# Patient Record
Sex: FEMALE | Race: OTHER | NOT HISPANIC OR LATINO | URBAN - METROPOLITAN AREA
[De-identification: names, ages, dates, MRNs, and addresses within clinical notes are randomized per-mention and may not be internally consistent; named-entity substitution may affect disease eponyms.]

---

## 2023-08-29 ENCOUNTER — EMERGENCY (EMERGENCY)
Facility: HOSPITAL | Age: 78
LOS: 0 days | Discharge: HOME | End: 2023-08-29
Attending: EMERGENCY MEDICINE
Payer: MEDICARE

## 2023-08-29 VITALS
DIASTOLIC BLOOD PRESSURE: 78 MMHG | SYSTOLIC BLOOD PRESSURE: 127 MMHG | OXYGEN SATURATION: 97 % | TEMPERATURE: 98 F | RESPIRATION RATE: 20 BRPM | HEART RATE: 75 BPM

## 2023-08-29 VITALS
TEMPERATURE: 98 F | SYSTOLIC BLOOD PRESSURE: 187 MMHG | RESPIRATION RATE: 18 BRPM | DIASTOLIC BLOOD PRESSURE: 106 MMHG | HEART RATE: 71 BPM | OXYGEN SATURATION: 96 % | WEIGHT: 78.04 LBS

## 2023-08-29 DIAGNOSIS — I10 ESSENTIAL (PRIMARY) HYPERTENSION: ICD-10-CM

## 2023-08-29 DIAGNOSIS — N39.0 URINARY TRACT INFECTION, SITE NOT SPECIFIED: ICD-10-CM

## 2023-08-29 DIAGNOSIS — R13.10 DYSPHAGIA, UNSPECIFIED: ICD-10-CM

## 2023-08-29 DIAGNOSIS — F02.80 DEMENTIA IN OTHER DISEASES CLASSIFIED ELSEWHERE, UNSPECIFIED SEVERITY, WITHOUT BEHAVIORAL DISTURBANCE, PSYCHOTIC DISTURBANCE, MOOD DISTURBANCE, AND ANXIETY: ICD-10-CM

## 2023-08-29 DIAGNOSIS — Z86.73 PERSONAL HISTORY OF TRANSIENT ISCHEMIC ATTACK (TIA), AND CEREBRAL INFARCTION WITHOUT RESIDUAL DEFICITS: ICD-10-CM

## 2023-08-29 DIAGNOSIS — G30.9 ALZHEIMER'S DISEASE, UNSPECIFIED: ICD-10-CM

## 2023-08-29 DIAGNOSIS — R41.0 DISORIENTATION, UNSPECIFIED: ICD-10-CM

## 2023-08-29 LAB
ALBUMIN SERPL ELPH-MCNC: 4.1 G/DL — SIGNIFICANT CHANGE UP (ref 3.5–5.2)
ALP SERPL-CCNC: 100 U/L — SIGNIFICANT CHANGE UP (ref 30–115)
ALT FLD-CCNC: 12 U/L — SIGNIFICANT CHANGE UP (ref 0–41)
ANION GAP SERPL CALC-SCNC: 10 MMOL/L — SIGNIFICANT CHANGE UP (ref 7–14)
APPEARANCE UR: ABNORMAL
APTT BLD: 34.2 SEC — SIGNIFICANT CHANGE UP (ref 27–39.2)
AST SERPL-CCNC: 16 U/L — SIGNIFICANT CHANGE UP (ref 0–41)
BASOPHILS # BLD AUTO: 0.05 K/UL — SIGNIFICANT CHANGE UP (ref 0–0.2)
BASOPHILS NFR BLD AUTO: 0.5 % — SIGNIFICANT CHANGE UP (ref 0–1)
BILIRUB SERPL-MCNC: 0.6 MG/DL — SIGNIFICANT CHANGE UP (ref 0.2–1.2)
BILIRUB UR-MCNC: NEGATIVE — SIGNIFICANT CHANGE UP
BUN SERPL-MCNC: 14 MG/DL — SIGNIFICANT CHANGE UP (ref 10–20)
CALCIUM SERPL-MCNC: 9.5 MG/DL — SIGNIFICANT CHANGE UP (ref 8.4–10.5)
CHLORIDE SERPL-SCNC: 102 MMOL/L — SIGNIFICANT CHANGE UP (ref 98–110)
CO2 SERPL-SCNC: 24 MMOL/L — SIGNIFICANT CHANGE UP (ref 17–32)
COLOR SPEC: YELLOW — SIGNIFICANT CHANGE UP
CREAT SERPL-MCNC: 0.7 MG/DL — SIGNIFICANT CHANGE UP (ref 0.7–1.5)
DIFF PNL FLD: ABNORMAL
EGFR: 89 ML/MIN/1.73M2 — SIGNIFICANT CHANGE UP
EOSINOPHIL # BLD AUTO: 0.09 K/UL — SIGNIFICANT CHANGE UP (ref 0–0.7)
EOSINOPHIL NFR BLD AUTO: 0.9 % — SIGNIFICANT CHANGE UP (ref 0–8)
GLUCOSE SERPL-MCNC: 89 MG/DL — SIGNIFICANT CHANGE UP (ref 70–99)
GLUCOSE UR QL: NEGATIVE MG/DL — SIGNIFICANT CHANGE UP
HCT VFR BLD CALC: 37.6 % — SIGNIFICANT CHANGE UP (ref 37–47)
HGB BLD-MCNC: 12.2 G/DL — SIGNIFICANT CHANGE UP (ref 12–16)
IMM GRANULOCYTES NFR BLD AUTO: 0.2 % — SIGNIFICANT CHANGE UP (ref 0.1–0.3)
INR BLD: 0.96 RATIO — SIGNIFICANT CHANGE UP (ref 0.65–1.3)
KETONES UR-MCNC: NEGATIVE MG/DL — SIGNIFICANT CHANGE UP
LEUKOCYTE ESTERASE UR-ACNC: ABNORMAL
LYMPHOCYTES # BLD AUTO: 2.7 K/UL — SIGNIFICANT CHANGE UP (ref 1.2–3.4)
LYMPHOCYTES # BLD AUTO: 27 % — SIGNIFICANT CHANGE UP (ref 20.5–51.1)
MCHC RBC-ENTMCNC: 29.8 PG — SIGNIFICANT CHANGE UP (ref 27–31)
MCHC RBC-ENTMCNC: 32.4 G/DL — SIGNIFICANT CHANGE UP (ref 32–37)
MCV RBC AUTO: 91.7 FL — SIGNIFICANT CHANGE UP (ref 81–99)
MONOCYTES # BLD AUTO: 0.62 K/UL — HIGH (ref 0.1–0.6)
MONOCYTES NFR BLD AUTO: 6.2 % — SIGNIFICANT CHANGE UP (ref 1.7–9.3)
NEUTROPHILS # BLD AUTO: 6.53 K/UL — HIGH (ref 1.4–6.5)
NEUTROPHILS NFR BLD AUTO: 65.2 % — SIGNIFICANT CHANGE UP (ref 42.2–75.2)
NITRITE UR-MCNC: POSITIVE
NRBC # BLD: 0 /100 WBCS — SIGNIFICANT CHANGE UP (ref 0–0)
PH UR: 6.5 — SIGNIFICANT CHANGE UP (ref 5–8)
PLATELET # BLD AUTO: 389 K/UL — SIGNIFICANT CHANGE UP (ref 130–400)
PMV BLD: 9.7 FL — SIGNIFICANT CHANGE UP (ref 7.4–10.4)
POTASSIUM SERPL-MCNC: 4.2 MMOL/L — SIGNIFICANT CHANGE UP (ref 3.5–5)
POTASSIUM SERPL-SCNC: 4.2 MMOL/L — SIGNIFICANT CHANGE UP (ref 3.5–5)
PROT SERPL-MCNC: 6.4 G/DL — SIGNIFICANT CHANGE UP (ref 6–8)
PROT UR-MCNC: SIGNIFICANT CHANGE UP MG/DL
PROTHROM AB SERPL-ACNC: 10.9 SEC — SIGNIFICANT CHANGE UP (ref 9.95–12.87)
RBC # BLD: 4.1 M/UL — LOW (ref 4.2–5.4)
RBC # FLD: 12.1 % — SIGNIFICANT CHANGE UP (ref 11.5–14.5)
SODIUM SERPL-SCNC: 136 MMOL/L — SIGNIFICANT CHANGE UP (ref 135–146)
SP GR SPEC: >1.03 — HIGH (ref 1–1.03)
TROPONIN T SERPL-MCNC: <0.01 NG/ML — SIGNIFICANT CHANGE UP
UROBILINOGEN FLD QL: 0.2 MG/DL — SIGNIFICANT CHANGE UP (ref 0.2–1)
WBC # BLD: 10.01 K/UL — SIGNIFICANT CHANGE UP (ref 4.8–10.8)
WBC # FLD AUTO: 10.01 K/UL — SIGNIFICANT CHANGE UP (ref 4.8–10.8)

## 2023-08-29 PROCEDURE — 0042T: CPT | Mod: MA

## 2023-08-29 PROCEDURE — 81001 URINALYSIS AUTO W/SCOPE: CPT

## 2023-08-29 PROCEDURE — 85025 COMPLETE CBC W/AUTO DIFF WBC: CPT

## 2023-08-29 PROCEDURE — 70498 CT ANGIOGRAPHY NECK: CPT | Mod: MA

## 2023-08-29 PROCEDURE — 70496 CT ANGIOGRAPHY HEAD: CPT | Mod: 26,MA

## 2023-08-29 PROCEDURE — 99285 EMERGENCY DEPT VISIT HI MDM: CPT | Mod: 25

## 2023-08-29 PROCEDURE — 85730 THROMBOPLASTIN TIME PARTIAL: CPT

## 2023-08-29 PROCEDURE — 80053 COMPREHEN METABOLIC PANEL: CPT

## 2023-08-29 PROCEDURE — 82962 GLUCOSE BLOOD TEST: CPT

## 2023-08-29 PROCEDURE — 93005 ELECTROCARDIOGRAM TRACING: CPT

## 2023-08-29 PROCEDURE — 70450 CT HEAD/BRAIN W/O DYE: CPT | Mod: XU,MA

## 2023-08-29 PROCEDURE — 87186 SC STD MICRODIL/AGAR DIL: CPT

## 2023-08-29 PROCEDURE — 70496 CT ANGIOGRAPHY HEAD: CPT | Mod: MA

## 2023-08-29 PROCEDURE — 85610 PROTHROMBIN TIME: CPT

## 2023-08-29 PROCEDURE — 84484 ASSAY OF TROPONIN QUANT: CPT

## 2023-08-29 PROCEDURE — 93010 ELECTROCARDIOGRAM REPORT: CPT

## 2023-08-29 PROCEDURE — 71045 X-RAY EXAM CHEST 1 VIEW: CPT

## 2023-08-29 PROCEDURE — 96374 THER/PROPH/DIAG INJ IV PUSH: CPT | Mod: XU

## 2023-08-29 PROCEDURE — 70498 CT ANGIOGRAPHY NECK: CPT | Mod: 26,MA

## 2023-08-29 PROCEDURE — 71045 X-RAY EXAM CHEST 1 VIEW: CPT | Mod: 26

## 2023-08-29 PROCEDURE — 99285 EMERGENCY DEPT VISIT HI MDM: CPT | Mod: GC

## 2023-08-29 PROCEDURE — 87086 URINE CULTURE/COLONY COUNT: CPT

## 2023-08-29 PROCEDURE — 36415 COLL VENOUS BLD VENIPUNCTURE: CPT

## 2023-08-29 PROCEDURE — 70450 CT HEAD/BRAIN W/O DYE: CPT | Mod: 26,MA,59

## 2023-08-29 RX ORDER — CEFPODOXIME PROXETIL 100 MG
1 TABLET ORAL
Qty: 20 | Refills: 0
Start: 2023-08-29 | End: 2023-09-07

## 2023-08-29 RX ORDER — CEFTRIAXONE 500 MG/1
1000 INJECTION, POWDER, FOR SOLUTION INTRAMUSCULAR; INTRAVENOUS ONCE
Refills: 0 | Status: COMPLETED | OUTPATIENT
Start: 2023-08-29 | End: 2023-08-29

## 2023-08-29 RX ADMIN — CEFTRIAXONE 100 MILLIGRAM(S): 500 INJECTION, POWDER, FOR SOLUTION INTRAMUSCULAR; INTRAVENOUS at 12:14

## 2023-08-29 NOTE — ED PROVIDER NOTE - PATIENT PORTAL LINK FT
You can access the FollowMyHealth Patient Portal offered by [facility] by registering at the following website: [email or web address]. By joining [employer]’s FollowMyHealth portal, you will also be able to view your health information using other applications (apps) compatible with our system.

## 2023-08-29 NOTE — ED PROVIDER NOTE - OBJECTIVE STATEMENT
77-year-old female past medical history of hypertension TIA dementia right eye blindness since childhood coming in here for worsening confusion and difficulty swallowing as per daughter at bedside.  Patient lives at home with nurse aide who states that patient was last known well at bed last night.  Patient symptoms started this morning but is now at baseline.  No other complaints

## 2023-08-29 NOTE — ED PROVIDER NOTE - PROGRESS NOTE DETAILS
Found to have UTI, no CVA tenderness, will start patient on ceftriaxone. Tolerating PO Resident MDM: 77-year-old female here with strokelike symptoms of worsening confusion and difficulty swallowing.  Labs urine chest x-ray and imaging ordered.  Work-up positive for UTI.  Tolerating p.o. given 1 time dose of IV antibiotics.  Will send home on p.o.-Authored by Jv Mattson

## 2023-08-29 NOTE — ED PROVIDER NOTE - ATTENDING CONTRIBUTION TO CARE
I personally evaluated the patient. I reviewed the Resident’s or Physician Assistant’s note (as assigned above), and agree with the findings and plan except as documented in my note.  77-year-old female past medical history significant for TIA, hypertension, dementia, right eye blindness, brought to the ED from home with confusion and difficulty swallowing this morning.  Patient was well when she went to bed last night as per the home health aide at the bedside.  Patient is currently back at her baseline.  Patient currently with no complaints.  As per home health aide, patient with generalized weakness for the past 4 days.  No fever, chills.  No cough, shortness of breath.  Patient had 1 episode of chest pain 2 days ago which lasted several seconds.  No abdominal pain, nausea, vomiting or diarrhea.  Normal appetite.  Vitals noted.  CONSTITUTIONAL: Thin, frail.+ [place] tremor.   HEAD: Normocephalic; atraumatic.   EYES: R eye blindness. L eye EOMI.  ENT: Normal pharynx with no tonsillar hypertrophy. MMM.  NECK: Supple; non-tender; no cervical lymphadenopathy.   CHEST: Normal chest excursion with respiration.   CARDIOVASCULAR: Normal S1, S2; no murmurs, rubs, or gallops.   RESPIRATORY: Normal chest excursion with respiration; breath sounds clear and equal bilaterally; no wheezes, rhonchi, or rales.  GI/: Normal bowel sounds; non-distended; non-tender.  BACK: No evidence of trauma or deformity. Non-tender to palpation. No CVA tenderness.   EXT: Normal ROM in all four extremities; non-tender to palpation; distal pulses are normal. No leg edema B/L.   SKIN: Normal for age and race; warm; dry; good turgor.  NEURO: A & O x 2; CN 2-12 intact. Grossly unremarkable.

## 2023-08-29 NOTE — CONSULT NOTE ADULT - ASSESSMENT
Impression:  77 year old woman with a PMH of HTN alzheimer's disease and HTN presents to the ED after waking up at 0700 and the aide noticed patient staring and did not know her daughters name. Patient had some coughing and difficulty swallowing as per aide. Family reports approaching baseline in ED.   Stroke code in triage. Out of the window for IV thrombolytics. CTA without LVO therefore not a candidate for IA intervention. Patient with age indeterminate ischemic change on CTH. Etiology of symptoms may be epileptiform, or due to encephalopathy less likely ischemic stroke.     Suggestion:  Can get MRI brain without margie  Routine EEG   Keep magnesium >2  Seizure precautions  UA C+S, CXR Impression:  77 year old woman with a PMH of HTN alzheimer's disease and HTN presents to the ED after waking up at 0700 and the aide noticed patient staring and did not know her daughters name. Patient had some coughing and difficulty swallowing as per aide. Family reports approaching baseline in ED.   Stroke code in triage. Out of the window for IV thrombolytics. CTA without LVO therefore not a candidate for IA intervention. Patient with age indeterminate ischemic change on CTH. Etiology of symptoms more likely due to encephalopathy related to infectious cause and less likely ischemic stroke.     Suggestion:  More likely due to +UA and encephalopathy  Can get MRI brain for incidental age indeterminate infarct.   Keep magnesium >2  Seizure precautions

## 2023-08-29 NOTE — ED PROVIDER NOTE - CLINICAL SUMMARY MEDICAL DECISION MAKING FREE TEXT BOX
77-year-old female past medical history as documented brought to the ED with confusion and difficulty swallowing at home this morning upon awakening.  Stroke code activated in triage.  All labs reviewed. 77-year-old female past medical history as documented brought to the ED with confusion and difficulty swallowing at home this morning upon awakening.  Stroke code activated in triage.  All labs reviewed.UA with positive infection.  CT scans reviewed and no acute ischemic event or LVO.  Patient evaluated by neurology and recommendations appreciated.  Patient given IV antibiotics in the ED and discharged on oral antibiotics for UTI.  Patient and family given return precautions and discharged to home.

## 2023-08-29 NOTE — ED ADULT TRIAGE NOTE - CHIEF COMPLAINT QUOTE
As per daughter patient has hx of tia and this morning was found to be more altered then usual, was speaking "Giberish" and had new  difficulty swallowing and. In triage patient is back to baseline mental status  with no other neuromotor deficits noted

## 2023-08-29 NOTE — ED PROVIDER NOTE - PHYSICAL EXAMINATION
CONSTITUTIONAL:  in no acute distress.   SKIN: warm, dry  HEAD: Normocephalic; atraumatic.  EYES: PERRL, EOMI, normal sclera and conjunctiva   ENT: No nasal discharge; airway clear.  NECK: Supple; non tender.  CARD:  Regular rate and rhythm.   RESP: NO inc WOB   ABD: soft ntnd  EXT: Normal ROM.    LYMPH: No acute cervical adenopathy.  NEURO: AAO x 3, normal speech, no facial asymmetry, negative pronator drift, no ataxia, negative Romberg, no dysdiadokinesia, no nystagmus, peripheral vision intact in left eye , sensory equal and intact.  PSYCH: Cooperative, appropriate.

## 2024-02-21 ENCOUNTER — INPATIENT (INPATIENT)
Facility: HOSPITAL | Age: 79
LOS: 12 days | Discharge: HOSPICE | DRG: 871 | End: 2024-03-05
Attending: STUDENT IN AN ORGANIZED HEALTH CARE EDUCATION/TRAINING PROGRAM | Admitting: INTERNAL MEDICINE
Payer: MEDICARE

## 2024-02-21 VITALS
HEART RATE: 121 BPM | RESPIRATION RATE: 18 BRPM | OXYGEN SATURATION: 94 % | DIASTOLIC BLOOD PRESSURE: 63 MMHG | SYSTOLIC BLOOD PRESSURE: 129 MMHG

## 2024-02-21 DIAGNOSIS — R50.9 FEVER, UNSPECIFIED: ICD-10-CM

## 2024-02-21 LAB
ALBUMIN SERPL ELPH-MCNC: 4.4 G/DL — SIGNIFICANT CHANGE UP (ref 3.5–5.2)
ALP SERPL-CCNC: 72 U/L — SIGNIFICANT CHANGE UP (ref 30–115)
ALT FLD-CCNC: 19 U/L — SIGNIFICANT CHANGE UP (ref 0–41)
ANION GAP SERPL CALC-SCNC: 12 MMOL/L — SIGNIFICANT CHANGE UP (ref 7–14)
ANION GAP SERPL CALC-SCNC: 17 MMOL/L — HIGH (ref 7–14)
APPEARANCE UR: ABNORMAL
APTT BLD: 31.6 SEC — SIGNIFICANT CHANGE UP (ref 27–39.2)
AST SERPL-CCNC: 22 U/L — SIGNIFICANT CHANGE UP (ref 0–41)
BACTERIA # UR AUTO: ABNORMAL /HPF
BASE EXCESS BLDV CALC-SCNC: 0.2 MMOL/L — SIGNIFICANT CHANGE UP (ref -2–3)
BASOPHILS # BLD AUTO: 0.03 K/UL — SIGNIFICANT CHANGE UP (ref 0–0.2)
BASOPHILS NFR BLD AUTO: 0.1 % — SIGNIFICANT CHANGE UP (ref 0–1)
BILIRUB SERPL-MCNC: 0.9 MG/DL — SIGNIFICANT CHANGE UP (ref 0.2–1.2)
BILIRUB UR-MCNC: ABNORMAL
BUN SERPL-MCNC: 57 MG/DL — HIGH (ref 10–20)
BUN SERPL-MCNC: 66 MG/DL — CRITICAL HIGH (ref 10–20)
CA-I SERPL-SCNC: 1.29 MMOL/L — SIGNIFICANT CHANGE UP (ref 1.15–1.33)
CALCIUM SERPL-MCNC: 11.3 MG/DL — HIGH (ref 8.4–10.5)
CALCIUM SERPL-MCNC: 9.9 MG/DL — SIGNIFICANT CHANGE UP (ref 8.4–10.5)
CHLORIDE SERPL-SCNC: 120 MMOL/L — HIGH (ref 98–110)
CHLORIDE SERPL-SCNC: 124 MMOL/L — HIGH (ref 98–110)
CO2 SERPL-SCNC: 20 MMOL/L — SIGNIFICANT CHANGE UP (ref 17–32)
CO2 SERPL-SCNC: 23 MMOL/L — SIGNIFICANT CHANGE UP (ref 17–32)
COLOR SPEC: SIGNIFICANT CHANGE UP
CREAT SERPL-MCNC: 1.3 MG/DL — SIGNIFICANT CHANGE UP (ref 0.7–1.5)
CREAT SERPL-MCNC: 1.5 MG/DL — SIGNIFICANT CHANGE UP (ref 0.7–1.5)
DIFF PNL FLD: ABNORMAL
EGFR: 35 ML/MIN/1.73M2 — LOW
EGFR: 42 ML/MIN/1.73M2 — LOW
EOSINOPHIL # BLD AUTO: 0 K/UL — SIGNIFICANT CHANGE UP (ref 0–0.7)
EOSINOPHIL NFR BLD AUTO: 0 % — SIGNIFICANT CHANGE UP (ref 0–8)
EPI CELLS # UR: PRESENT
FLUAV AG NPH QL: SIGNIFICANT CHANGE UP
FLUBV AG NPH QL: SIGNIFICANT CHANGE UP
GAS PNL BLDV: 157 MMOL/L — HIGH (ref 136–145)
GAS PNL BLDV: SIGNIFICANT CHANGE UP
GLUCOSE SERPL-MCNC: 108 MG/DL — HIGH (ref 70–99)
GLUCOSE SERPL-MCNC: 134 MG/DL — HIGH (ref 70–99)
GLUCOSE UR QL: NEGATIVE MG/DL — SIGNIFICANT CHANGE UP
HCO3 BLDV-SCNC: 20 MMOL/L — LOW (ref 22–29)
HCT VFR BLD CALC: 44.5 % — SIGNIFICANT CHANGE UP (ref 37–47)
HCT VFR BLDA CALC: 47 % — HIGH (ref 34.5–46.5)
HGB BLD CALC-MCNC: 15.5 G/DL — SIGNIFICANT CHANGE UP (ref 11.7–16.1)
HGB BLD-MCNC: 15.1 G/DL — SIGNIFICANT CHANGE UP (ref 12–16)
IMM GRANULOCYTES NFR BLD AUTO: 0.4 % — HIGH (ref 0.1–0.3)
INR BLD: 1.32 RATIO — HIGH (ref 0.65–1.3)
KETONES UR-MCNC: ABNORMAL MG/DL
LACTATE BLDV-MCNC: 3.6 MMOL/L — HIGH (ref 0.5–2)
LACTATE SERPL-SCNC: 2 MMOL/L — SIGNIFICANT CHANGE UP (ref 0.7–2)
LEUKOCYTE ESTERASE UR-ACNC: ABNORMAL
LYMPHOCYTES # BLD AUTO: 1.96 K/UL — SIGNIFICANT CHANGE UP (ref 1.2–3.4)
LYMPHOCYTES # BLD AUTO: 9.4 % — LOW (ref 20.5–51.1)
MCHC RBC-ENTMCNC: 30.1 PG — SIGNIFICANT CHANGE UP (ref 27–31)
MCHC RBC-ENTMCNC: 33.9 G/DL — SIGNIFICANT CHANGE UP (ref 32–37)
MCV RBC AUTO: 88.8 FL — SIGNIFICANT CHANGE UP (ref 81–99)
MONOCYTES # BLD AUTO: 0.86 K/UL — HIGH (ref 0.1–0.6)
MONOCYTES NFR BLD AUTO: 4.1 % — SIGNIFICANT CHANGE UP (ref 1.7–9.3)
NEUTROPHILS # BLD AUTO: 17.85 K/UL — HIGH (ref 1.4–6.5)
NEUTROPHILS NFR BLD AUTO: 86 % — HIGH (ref 42.2–75.2)
NITRITE UR-MCNC: POSITIVE
NRBC # BLD: 0 /100 WBCS — SIGNIFICANT CHANGE UP (ref 0–0)
PCO2 BLDV: 21 MMHG — LOW (ref 39–42)
PH BLDV: 7.58 — HIGH (ref 7.32–7.43)
PH UR: 5.5 — SIGNIFICANT CHANGE UP (ref 5–8)
PLATELET # BLD AUTO: 413 K/UL — HIGH (ref 130–400)
PMV BLD: 10.8 FL — HIGH (ref 7.4–10.4)
PO2 BLDV: 66 MMHG — HIGH (ref 25–45)
POTASSIUM BLDV-SCNC: 3.5 MMOL/L — SIGNIFICANT CHANGE UP (ref 3.5–5.1)
POTASSIUM SERPL-MCNC: 3.6 MMOL/L — SIGNIFICANT CHANGE UP (ref 3.5–5)
POTASSIUM SERPL-MCNC: 3.8 MMOL/L — SIGNIFICANT CHANGE UP (ref 3.5–5)
POTASSIUM SERPL-SCNC: 3.6 MMOL/L — SIGNIFICANT CHANGE UP (ref 3.5–5)
POTASSIUM SERPL-SCNC: 3.8 MMOL/L — SIGNIFICANT CHANGE UP (ref 3.5–5)
PROT SERPL-MCNC: 6.8 G/DL — SIGNIFICANT CHANGE UP (ref 6–8)
PROT UR-MCNC: 100 MG/DL
PROTHROM AB SERPL-ACNC: 15.1 SEC — HIGH (ref 9.95–12.87)
RBC # BLD: 5.01 M/UL — SIGNIFICANT CHANGE UP (ref 4.2–5.4)
RBC # FLD: 13.4 % — SIGNIFICANT CHANGE UP (ref 11.5–14.5)
RBC CASTS # UR COMP ASSIST: 18 /HPF — HIGH (ref 0–4)
RSV RNA NPH QL NAA+NON-PROBE: SIGNIFICANT CHANGE UP
SAO2 % BLDV: 94.6 % — HIGH (ref 67–88)
SARS-COV-2 RNA SPEC QL NAA+PROBE: SIGNIFICANT CHANGE UP
SODIUM SERPL-SCNC: 155 MMOL/L — HIGH (ref 135–146)
SODIUM SERPL-SCNC: 161 MMOL/L — CRITICAL HIGH (ref 135–146)
SP GR SPEC: >1.03 — HIGH (ref 1–1.03)
UROBILINOGEN FLD QL: 1 MG/DL — SIGNIFICANT CHANGE UP (ref 0.2–1)
WBC # BLD: 20.79 K/UL — HIGH (ref 4.8–10.8)
WBC # FLD AUTO: 20.79 K/UL — HIGH (ref 4.8–10.8)
WBC UR QL: 50 /HPF — HIGH (ref 0–5)

## 2024-02-21 PROCEDURE — 80048 BASIC METABOLIC PNL TOTAL CA: CPT

## 2024-02-21 PROCEDURE — 99221 1ST HOSP IP/OBS SF/LOW 40: CPT

## 2024-02-21 PROCEDURE — 70450 CT HEAD/BRAIN W/O DYE: CPT | Mod: 26,MG

## 2024-02-21 PROCEDURE — 99285 EMERGENCY DEPT VISIT HI MDM: CPT | Mod: FS

## 2024-02-21 PROCEDURE — 71045 X-RAY EXAM CHEST 1 VIEW: CPT | Mod: 26

## 2024-02-21 PROCEDURE — 92526 ORAL FUNCTION THERAPY: CPT | Mod: GN

## 2024-02-21 PROCEDURE — 36415 COLL VENOUS BLD VENIPUNCTURE: CPT

## 2024-02-21 PROCEDURE — 80053 COMPREHEN METABOLIC PANEL: CPT

## 2024-02-21 PROCEDURE — 85027 COMPLETE CBC AUTOMATED: CPT

## 2024-02-21 PROCEDURE — 83735 ASSAY OF MAGNESIUM: CPT

## 2024-02-21 PROCEDURE — 82962 GLUCOSE BLOOD TEST: CPT

## 2024-02-21 PROCEDURE — G1004: CPT

## 2024-02-21 PROCEDURE — 85025 COMPLETE CBC W/AUTO DIFF WBC: CPT

## 2024-02-21 PROCEDURE — 92610 EVALUATE SWALLOWING FUNCTION: CPT | Mod: GN

## 2024-02-21 PROCEDURE — 86803 HEPATITIS C AB TEST: CPT

## 2024-02-21 RX ORDER — SODIUM CHLORIDE 9 MG/ML
1000 INJECTION, SOLUTION INTRAVENOUS ONCE
Refills: 0 | Status: COMPLETED | OUTPATIENT
Start: 2024-02-21 | End: 2024-02-21

## 2024-02-21 RX ORDER — SODIUM CHLORIDE 9 MG/ML
1000 INJECTION, SOLUTION INTRAVENOUS
Refills: 0 | Status: DISCONTINUED | OUTPATIENT
Start: 2024-02-21 | End: 2024-02-24

## 2024-02-21 RX ORDER — ACETAMINOPHEN 500 MG
975 TABLET ORAL ONCE
Refills: 0 | Status: COMPLETED | OUTPATIENT
Start: 2024-02-21 | End: 2024-02-21

## 2024-02-21 RX ORDER — SODIUM CHLORIDE 9 MG/ML
1000 INJECTION, SOLUTION INTRAVENOUS
Refills: 0 | Status: DISCONTINUED | OUTPATIENT
Start: 2024-02-21 | End: 2024-02-21

## 2024-02-21 RX ORDER — ESCITALOPRAM OXALATE 10 MG/1
5 TABLET, FILM COATED ORAL DAILY
Refills: 0 | Status: DISCONTINUED | OUTPATIENT
Start: 2024-02-21 | End: 2024-03-05

## 2024-02-21 RX ORDER — MORPHINE SULFATE 50 MG/1
2 CAPSULE, EXTENDED RELEASE ORAL ONCE
Refills: 0 | Status: DISCONTINUED | OUTPATIENT
Start: 2024-02-21 | End: 2024-02-21

## 2024-02-21 RX ORDER — BREXPIPRAZOLE 0.25 MG/1
1 TABLET ORAL
Refills: 0 | DISCHARGE

## 2024-02-21 RX ORDER — CEFTRIAXONE 500 MG/1
1000 INJECTION, POWDER, FOR SOLUTION INTRAMUSCULAR; INTRAVENOUS ONCE
Refills: 0 | Status: COMPLETED | OUTPATIENT
Start: 2024-02-21 | End: 2024-02-21

## 2024-02-21 RX ORDER — CEFTRIAXONE 500 MG/1
1000 INJECTION, POWDER, FOR SOLUTION INTRAMUSCULAR; INTRAVENOUS EVERY 24 HOURS
Refills: 0 | Status: DISCONTINUED | OUTPATIENT
Start: 2024-02-21 | End: 2024-02-24

## 2024-02-21 RX ADMIN — MORPHINE SULFATE 2 MILLIGRAM(S): 50 CAPSULE, EXTENDED RELEASE ORAL at 21:34

## 2024-02-21 RX ADMIN — SODIUM CHLORIDE 1000 MILLILITER(S): 9 INJECTION, SOLUTION INTRAVENOUS at 14:24

## 2024-02-21 RX ADMIN — CEFTRIAXONE 100 MILLIGRAM(S): 500 INJECTION, POWDER, FOR SOLUTION INTRAMUSCULAR; INTRAVENOUS at 17:25

## 2024-02-21 RX ADMIN — Medication 1 MILLIGRAM(S): at 18:46

## 2024-02-21 RX ADMIN — MORPHINE SULFATE 2 MILLIGRAM(S): 50 CAPSULE, EXTENDED RELEASE ORAL at 21:57

## 2024-02-21 RX ADMIN — Medication 975 MILLIGRAM(S): at 13:06

## 2024-02-21 RX ADMIN — SODIUM CHLORIDE 1000 MILLILITER(S): 9 INJECTION, SOLUTION INTRAVENOUS at 15:26

## 2024-02-21 NOTE — PATIENT PROFILE ADULT - FALL HARM RISK - HARM RISK INTERVENTIONS

## 2024-02-21 NOTE — H&P ADULT - HISTORY OF PRESENT ILLNESS
77yo female with history of advanced Alzheimer's dementia, is sent to the ER due to not taking oral nutrition for las several days. Daughter also noted increased contractures  77yo female with history of advanced Alzheimer's dementia, is sent to the ER due to not taking oral nutrition for las several days. Daughter also noted increased contractures. No fevers or chills  77yo female with history of advanced Alzheimer's dementia, is sent to the ER due to not taking oral nutrition for last several days. Daughter also noted increased contractures. No fevers or chills

## 2024-02-21 NOTE — ED PROVIDER NOTE - PHYSICAL EXAMINATION
VITAL SIGNS: I have reviewed nursing notes and confirm.  CONSTITUTIONAL: cachectic, ill-appearing  SKIN: skin exam is warm and dry, no acute rash.    HEAD: Normocephalic; atraumatic.  EYES: conjunctiva and sclera clear.  ENT: No nasal discharge; airway clear.  CARD: S1, S2 normal; no murmurs, gallops, or rubs. Regular rate and rhythm.   RESP: No wheezes, rales or rhonchi.  ABD: Normal bowel sounds; soft; non-distended; non-tender  EXT: cachectic, contracted  NEURO: Alert

## 2024-02-21 NOTE — PATIENT PROFILE ADULT - NSPROMEDSADMININFO_GEN_A_NUR
difficulty swallowing pills Isotretinoin Pregnancy And Lactation Text: This medication is Pregnancy Category X and is considered extremely dangerous during pregnancy. It is unknown if it is excreted in breast milk.

## 2024-02-21 NOTE — ED PROVIDER NOTE - CLINICAL SUMMARY MEDICAL DECISION MAKING FREE TEXT BOX
patient presents for medical evaluation as the patient has significantly advanced Alzheimer's and has deteriorated over the past week to the point where the patient is unable to tolerate p.o. unable to swallow we obtained IV access administered IV fluids we obtain labs her white blood cell count is elevated to 20 sodium 161 lactate 3.6 RVP which was negative CT head which is negative chest x-ray per my independent evaluation not consistent with pneumonia not consistent with pneumothorax in addition obtain x-ray per my independent evaluation not consistent with STEMI arrhythmia or QT prolongation given this patient's clinical scenario I will admit for further evaluation at this time as she is unable to tolerate p.o.

## 2024-02-21 NOTE — ED PROVIDER NOTE - OBJECTIVE STATEMENT
Patient is a 70-year-old female who is seen today brought in by ambulance from home visit as patient has advanced Alzheimer's who has declined considerably over the past 7 days she is unable to tolerate p.o. unable to swallow with worsening contractions history is obtained from the daughter as well as EMS patient is unable to provide any history daughter denies any vomiting diarrhea denies any fevers or chills patient started does note increased agitation

## 2024-02-21 NOTE — H&P ADULT - ASSESSMENT
Adult Failure to thrive with poor oral intake    Marked dehydration with hypernatremia, hypercalcemia, increased anion gap    Fever with leukocytosis (sepsis) - for now continue Rocephin started in the ER    increased contractures      Adult Failure to thrive with poor oral intake - Diet and speech therapy evaluation ordered along with  - monitor     Marked dehydration with hypernatremia, hypercalcemia, increased anion gap - IV fluids to be adjusted as clinically indicated     Fever with leukocytosis (sepsis) - for now continue Rocephin started in the ER    Alzheimer's now with agitation, could have element of a metabolic encephalopathy related to above listed issues. Family to bring in meds for verification. Monitor while above issues are addressed     increased contractures - monitor

## 2024-02-21 NOTE — PATIENT PROFILE ADULT - FUNCTIONAL ASSESSMENT - DAILY ACTIVITY 3.
Post-Operative Progess Note


Surgeon (s)/Assistant (s)


Surgeon


KATLIN MACKAY MD


Assistant


n/a





Pre-Operative Diagnosis


Bilat Persistent Tubes





Post-Operative Diagnosis


same





Post-Op Procedure Note


Date of Procedure:  Aug 4, 2022


Name of Procedure Performed:  


Bilat Removal of Tubes with Bialteral TM Patches


Description & Findings


Description and Findings:





n/a


Anesthesia Type


mask


Estimated Blood Loss


minimal


Packing


none.


Specimen(s) collected/removed


none











KATLIN MACKAY MD              Aug 4, 2022 07:24
1 = Total assistance

## 2024-02-21 NOTE — ED PROVIDER NOTE - ATTENDING APP SHARED VISIT CONTRIBUTION OF CARE
I have personally performed a history and physical exam on this patient and personally directed the management of the patient. Patient is a 70-year-old female who is seen today brought in by ambulance from home visit as patient has advanced Alzheimer's who has declined considerably over the past 7 days she is unable to tolerate p.o. unable to swallow with worsening contractions history is obtained from the daughter as well as EMS patient is unable to provide any history daughter denies any vomiting diarrhea denies any fevers or chills patient started does note increased agitation     On physical exam patient is frail cachectic normocephalic right sided globe deformity which is chronic left side pupils reactive oropharynx is clear but extraordinarily dry chest is clear to auscultation bilaterally abdomen soft nontender nondistended bowel sounds positive radial pulse 2+ pedal pulses 2+ patient and contracted state    Assessment plan patient presents for medical evaluation as the patient has significantly advanced Alzheimer's and has deteriorated over the past week to the point where the patient is unable to tolerate p.o. unable to swallow we obtained IV access administered IV fluids we obtain labs her white blood cell count is elevated to 20 sodium 161 lactate 3.6 RVP which was negative CT head which is negative chest x-ray per my independent evaluation not consistent with pneumonia not consistent with pneumothorax in addition obtain x-ray per my independent evaluation not consistent with STEMI arrhythmia or QT prolongation given this patient's clinical scenario I will admit for further evaluation at this time as she is unable to tolerate p.o. I have personally performed a history and physical exam on this patient and personally directed the management of the patient. Patient is a 70-year-old female who is seen today brought in by ambulance from home visit as patient has advanced Alzheimer's who has declined considerably over the past 7 days she is unable to tolerate p.o. unable to swallow with worsening contractions history is obtained from the daughter as well as EMS patient is unable to provide any history daughter denies any vomiting diarrhea denies any fevers or chills patient started does note increased agitation     On physical exam patient is frail cachectic normocephalic right sided globe deformity which is chronic left side pupils reactive oropharynx is clear but extraordinarily dry chest is clear to auscultation bilaterally abdomen soft nontender nondistended bowel sounds positive radial pulse 2+ pedal pulses 2+ patient and contracted state.    Assessment plan patient presents for medical evaluation as the patient has significantly advanced Alzheimer's and has deteriorated over the past week to the point where the patient is unable to tolerate p.o. unable to swallow we obtained IV access administered IV fluids we obtain labs her white blood cell count is elevated to 20 sodium 161 lactate 3.6 RVP which was negative CT head which is negative chest x-ray per my independent evaluation not consistent with pneumonia not consistent with pneumothorax in addition obtain x-ray per my independent evaluation not consistent with STEMI arrhythmia or QT prolongation given this patient's clinical scenario I will admit for further evaluation at this time as she is unable to tolerate p.o.

## 2024-02-21 NOTE — ED ADULT TRIAGE NOTE - CHIEF COMPLAINT QUOTE
BIBA via [facility] from home- pt daughter and ems report advanced alzhiemers, over the last week decreased PO intake, unable to swallow, contractures have worsened in arms and legs

## 2024-02-21 NOTE — H&P ADULT - NSHPLABSRESULTS_GEN_ALL_CORE
15.1   20.79 )-----------( 413      ( 21 Feb 2024 13:40 )             44.5         Ca    9.9      21 Feb 2024 21:31    TPro  6.8  /  Alb  4.4  /  TBili  0.9  /  DBili  x   /  AST  22  /  ALT  19  /  AlkPhos  72  02-21          Urinalysis Basic - ( 21 Feb 2024 21:31 )    Color: x / Appearance: x / SG: x / pH: x  Gluc: 108 mg/dL / Ketone: x  / Bili: x / Urobili: x   Blood: x / Protein: x / Nitrite: x   Leuk Esterase: x / RBC: x / WBC x   Sq Epi: x / Non Sq Epi: x / Bacteria: x      PT/INR - ( 21 Feb 2024 13:40 )   PT: 15.10 sec;   INR: 1.32 ratio         PTT - ( 21 Feb 2024 13:40 )  PTT:31.6 sec  Lactate Trend  02-21 @ 17:41 Lactate:2.0         CAPILLARY BLOOD GLUCOSE 15.1   20.79 )-----------( 413      ( 21 Feb 2024 13:40 )             44.5     Sodium - 161, AG- 17, BUN - 66, Calcium - 11.3    Ca    9.9      21 Feb 2024 21:31    TPro  6.8  /  Alb  4.4  /  TBili  0.9  /  DBili  x   /  AST  22  /  ALT  19  /  AlkPhos  72  02-21          Urinalysis Basic - ( 21 Feb 2024 21:31 )    Color: x / Appearance: x / SG: x / pH: x  Gluc: 108 mg/dL / Ketone: x  / Bili: x / Urobili: x   Blood: x / Protein: x / Nitrite: x   Leuk Esterase: x / RBC: x / WBC x   Sq Epi: x / Non Sq Epi: x / Bacteria: x      PT/INR - ( 21 Feb 2024 13:40 )   PT: 15.10 sec;   INR: 1.32 ratio         PTT - ( 21 Feb 2024 13:40 )  PTT:31.6 sec  Lactate Trend  02-21 @ 17:41 Lactate:2.0         CAPILLARY BLOOD GLUCOSE 15.1   20.79 )-----------( 413      ( 21 Feb 2024 13:40 )             44.5     Sodium - 161, AG- 17, BUN - 66, Calcium - 11.3    Ca    9.9      21 Feb 2024 21:31    TPro  6.8  /  Alb  4.4  /  TBili  0.9  /  DBili  x   /  AST  22  /  ALT  19  /  AlkPhos  72  02-21          Urinalysis Basic - ( 21 Feb 2024 21:31 )    Color: x / Appearance: x / SG: x / pH: x  Gluc: 108 mg/dL / Ketone: x  / Bili: x / Urobili: x   Blood: x / Protein: x / Nitrite: x   Leuk Esterase: x / RBC: x / WBC x   Sq Epi: x / Non Sq Epi: x / Bacteria: x      PT/INR - ( 21 Feb 2024 13:40 )   PT: 15.10 sec;   INR: 1.32 ratio         PTT - ( 21 Feb 2024 13:40 )  PTT:31.6 sec  Lactate Trend  02-21 @ 17:41 Lactate:2.0     EKG- Sinus with short CT with PAC'S, LAD, Nonspecific ST and T wave abnormality

## 2024-02-22 LAB
ALBUMIN SERPL ELPH-MCNC: 3.2 G/DL — LOW (ref 3.5–5.2)
ALP SERPL-CCNC: 52 U/L — SIGNIFICANT CHANGE UP (ref 30–115)
ALT FLD-CCNC: 14 U/L — SIGNIFICANT CHANGE UP (ref 0–41)
ANION GAP SERPL CALC-SCNC: 10 MMOL/L — SIGNIFICANT CHANGE UP (ref 7–14)
AST SERPL-CCNC: 21 U/L — SIGNIFICANT CHANGE UP (ref 0–41)
BILIRUB SERPL-MCNC: 0.4 MG/DL — SIGNIFICANT CHANGE UP (ref 0.2–1.2)
BUN SERPL-MCNC: 50 MG/DL — HIGH (ref 10–20)
CALCIUM SERPL-MCNC: 9.4 MG/DL — SIGNIFICANT CHANGE UP (ref 8.4–10.5)
CHLORIDE SERPL-SCNC: 119 MMOL/L — HIGH (ref 98–110)
CO2 SERPL-SCNC: 24 MMOL/L — SIGNIFICANT CHANGE UP (ref 17–32)
CREAT SERPL-MCNC: 0.9 MG/DL — SIGNIFICANT CHANGE UP (ref 0.7–1.5)
EGFR: 65 ML/MIN/1.73M2 — SIGNIFICANT CHANGE UP
GLUCOSE SERPL-MCNC: 114 MG/DL — HIGH (ref 70–99)
HCT VFR BLD CALC: 34 % — LOW (ref 37–47)
HCV AB S/CO SERPL IA: 0.04 COI — SIGNIFICANT CHANGE UP
HCV AB SERPL-IMP: SIGNIFICANT CHANGE UP
HGB BLD-MCNC: 11.1 G/DL — LOW (ref 12–16)
MCHC RBC-ENTMCNC: 29.8 PG — SIGNIFICANT CHANGE UP (ref 27–31)
MCHC RBC-ENTMCNC: 32.6 G/DL — SIGNIFICANT CHANGE UP (ref 32–37)
MCV RBC AUTO: 91.4 FL — SIGNIFICANT CHANGE UP (ref 81–99)
NRBC # BLD: 0 /100 WBCS — SIGNIFICANT CHANGE UP (ref 0–0)
PLATELET # BLD AUTO: 247 K/UL — SIGNIFICANT CHANGE UP (ref 130–400)
PMV BLD: 10.9 FL — HIGH (ref 7.4–10.4)
POTASSIUM SERPL-MCNC: 3.6 MMOL/L — SIGNIFICANT CHANGE UP (ref 3.5–5)
POTASSIUM SERPL-SCNC: 3.6 MMOL/L — SIGNIFICANT CHANGE UP (ref 3.5–5)
PROT SERPL-MCNC: 4.9 G/DL — LOW (ref 6–8)
RBC # BLD: 3.72 M/UL — LOW (ref 4.2–5.4)
RBC # FLD: 13.5 % — SIGNIFICANT CHANGE UP (ref 11.5–14.5)
SODIUM SERPL-SCNC: 153 MMOL/L — HIGH (ref 135–146)
WBC # BLD: 13.85 K/UL — HIGH (ref 4.8–10.8)
WBC # FLD AUTO: 13.85 K/UL — HIGH (ref 4.8–10.8)

## 2024-02-22 PROCEDURE — 99233 SBSQ HOSP IP/OBS HIGH 50: CPT

## 2024-02-22 RX ORDER — ACETAMINOPHEN 500 MG
325 TABLET ORAL EVERY 6 HOURS
Refills: 0 | Status: DISCONTINUED | OUTPATIENT
Start: 2024-02-22 | End: 2024-03-05

## 2024-02-22 RX ORDER — ENOXAPARIN SODIUM 100 MG/ML
40 INJECTION SUBCUTANEOUS EVERY 24 HOURS
Refills: 0 | Status: DISCONTINUED | OUTPATIENT
Start: 2024-02-22 | End: 2024-03-02

## 2024-02-22 RX ORDER — KETOROLAC TROMETHAMINE 30 MG/ML
30 SYRINGE (ML) INJECTION EVERY 8 HOURS
Refills: 0 | Status: DISCONTINUED | OUTPATIENT
Start: 2024-02-22 | End: 2024-02-24

## 2024-02-22 RX ADMIN — Medication 30 MILLIGRAM(S): at 23:48

## 2024-02-22 RX ADMIN — Medication 30 MILLIGRAM(S): at 16:20

## 2024-02-22 RX ADMIN — CEFTRIAXONE 100 MILLIGRAM(S): 500 INJECTION, POWDER, FOR SOLUTION INTRAMUSCULAR; INTRAVENOUS at 17:11

## 2024-02-22 NOTE — SWALLOW BEDSIDE ASSESSMENT ADULT - ORAL PREPARATORY PHASE
Refuses to accept bolus into oral cavity/Reduced oral grading/Bolus falls into left lateral sulci/Bolus falls into right lateral sulci

## 2024-02-22 NOTE — PROGRESS NOTE ADULT - ASSESSMENT
Adult Failure to thrive with poor oral intake - Diet and speech therapy evaluation ordered along with  - monitor     Marked dehydration with hypernatremia, hypercalcemia, increased anion gap - IV fluids to be adjusted as clinically indicated     Fever with leukocytosis (sepsis) - for now continue Rocephin started in the ER    Alzheimer's now with agitation, could have element of a metabolic encephalopathy related to above listed issues. Family to bring in meds for verification. Monitor while above issues are addressed     increased contractures - monitor  77yo female with history of advanced Alzheimer's dementia, is sent to the ER due to not taking oral nutrition for last several days. Daughter also noted increased contractures. No fevers or chills     ·	Adult Failure to thrive with poor oral intake  ·	Marked dehydration with hypernatremia, hypercalcemia, increased anion gap - IV fluids to be adjusted as clinically indicated   ·	Fever with leukocytosis (sepsis) - for now continue Rocephin started in the ER  ·	Alzheimer's now with agitation, could have element of a metabolic encephalopathy related to above listed issues. Family to bring in meds for verification. Monitor while above issues are addressed   ·	increased contractures - monitor     Assessment:     - Rocephin for urosepsis. F/u ucx and bl cx  - Keep NPO, did not pass speech eval  - Hypernatremia improving with D5 + 1/2 NS. Daily labs   - Pain control for contraction. No need for PT at this point   - F/u palliative c/s    DVT ppx: lovenox   Dispo: acute, pending ucx and bl cx and palliative eval

## 2024-02-22 NOTE — PROGRESS NOTE ADULT - SUBJECTIVE AND OBJECTIVE BOX
Hospital Day:  1d    Subjective:    Patient is a 78y old  Female who presents with a chief complaint of     Past Medical Hx:   Alzheimer's dementia      Past Sx:    Allergies:  No Known Allergies    Current Meds:   Standng Meds:  cefTRIAXone   IVPB 1000 milliGRAM(s) IV Intermittent every 24 hours  dextrose 5% + sodium chloride 0.45%. 1000 milliLiter(s) (40 mL/Hr) IV Continuous <Continuous>  escitalopram 5 milliGRAM(s) Oral daily    PRN Meds:    HOME MEDICATIONS:  Lexapro 10 mg oral tablet: 1 tab(s) orally  Rexulti 0.25 mg oral tablet: 1 tab(s) orally      Vital Signs:   T(F): 97.2 (02-22-24 @ 05:32), Max: 101.8 (02-21-24 @ 12:45)  HR: 80 (02-22-24 @ 05:32) (66 - 121)  BP: 93/57 (02-22-24 @ 05:32) (93/57 - 143/92)  RR: 18 (02-22-24 @ 05:32) (18 - 20)  SpO2: 96% (02-22-24 @ 05:32) (93% - 96%)        Physical Exam:   GENERAL: NAD  HEENT: NCAT  CHEST/LUNG: CTAB  HEART: Regular rate and rhythm; s1 s2 appreciated, No murmurs, rubs, or gallops  ABDOMEN: Soft, Nontender, Nondistended; Bowel sounds present  EXTREMITIES: No LE edema b/l  SKIN: no rashes, no new lesions  NERVOUS SYSTEM:  Alert & Oriented X3  LINES/CATHETERS:        Labs:                         15.1   20.79 )-----------( 413      ( 21 Feb 2024 13:40 )             44.5     Neutophil% 86.0, Lymphocyte% 9.4, Monocyte% 4.1, Bands% 0.4 02-21-24 @ 13:40    21 Feb 2024 21:31    155    |  120    |  57     ----------------------------<  108    3.6     |  23     |  1.3      Ca    9.9        21 Feb 2024 21:31    TPro  6.8    /  Alb  4.4    /  TBili  0.9    /  DBili  x      /  AST  22     /  ALT  19     /  AlkPhos  72     21 Feb 2024 13:40       pTT    31.6             ----< 1.32 INR  (02-21-24 @ 13:40)    15.10        PT                Urinalysis Basic - ( 21 Feb 2024 21:31 )    Color: x / Appearance: x / SG: x / pH: x  Gluc: 108 mg/dL / Ketone: x  / Bili: x / Urobili: x   Blood: x / Protein: x / Nitrite: x   Leuk Esterase: x / RBC: x / WBC x   Sq Epi: x / Non Sq Epi: x / Bacteria: x             Hospital Day:  1d    Subjective:    Patient is a 78y old  Female who presents with a chief complaint of failure to thrive. Lethargic this morning.     Past Medical Hx:   Alzheimer's dementia      Past Sx:    Allergies:  No Known Allergies    Current Meds:   Standng Meds:  cefTRIAXone   IVPB 1000 milliGRAM(s) IV Intermittent every 24 hours  dextrose 5% + sodium chloride 0.45%. 1000 milliLiter(s) (40 mL/Hr) IV Continuous <Continuous>  escitalopram 5 milliGRAM(s) Oral daily    PRN Meds:    HOME MEDICATIONS:  Lexapro 10 mg oral tablet: 1 tab(s) orally  Rexulti 0.25 mg oral tablet: 1 tab(s) orally      Vital Signs:   T(F): 97.2 (02-22-24 @ 05:32), Max: 101.8 (02-21-24 @ 12:45)  HR: 80 (02-22-24 @ 05:32) (66 - 121)  BP: 93/57 (02-22-24 @ 05:32) (93/57 - 143/92)  RR: 18 (02-22-24 @ 05:32) (18 - 20)  SpO2: 96% (02-22-24 @ 05:32) (93% - 96%)        Physical Exam:   GENERAL: Frail, unkempt, malnourished, elderly   HEENT: NCAT  CHEST/LUNG: CTAB  HEART: Regular rate and rhythm; s1 s2 appreciated, No murmurs, rubs, or gallops  ABDOMEN: Soft, Nontender, Nondistended; Bowel sounds present  EXTREMITIES: No LE edema b/l  SKIN: no rashes, no new lesions  NERVOUS SYSTEM:  Alert & Oriented X3        Labs:                         15.1   20.79 )-----------( 413      ( 21 Feb 2024 13:40 )             44.5     Neutophil% 86.0, Lymphocyte% 9.4, Monocyte% 4.1, Bands% 0.4 02-21-24 @ 13:40    21 Feb 2024 21:31    155    |  120    |  57     ----------------------------<  108    3.6     |  23     |  1.3      Ca    9.9        21 Feb 2024 21:31    TPro  6.8    /  Alb  4.4    /  TBili  0.9    /  DBili  x      /  AST  22     /  ALT  19     /  AlkPhos  72     21 Feb 2024 13:40       pTT    31.6             ----< 1.32 INR  (02-21-24 @ 13:40)    15.10        PT                Urinalysis Basic - ( 21 Feb 2024 21:31 )    Color: x / Appearance: x / SG: x / pH: x  Gluc: 108 mg/dL / Ketone: x  / Bili: x / Urobili: x   Blood: x / Protein: x / Nitrite: x   Leuk Esterase: x / RBC: x / WBC x   Sq Epi: x / Non Sq Epi: x / Bacteria: x

## 2024-02-22 NOTE — SWALLOW BEDSIDE ASSESSMENT ADULT - PHARYNGEAL PHASE
cough attempt characterized by prolonged vocalization, unproductive/Wet vocal quality post oral intake/Cough post oral intake/Multiple swallows

## 2024-02-23 DIAGNOSIS — R50.9 FEVER, UNSPECIFIED: ICD-10-CM

## 2024-02-23 DIAGNOSIS — E63.9 NUTRITIONAL DEFICIENCY, UNSPECIFIED: ICD-10-CM

## 2024-02-23 DIAGNOSIS — Z51.5 ENCOUNTER FOR PALLIATIVE CARE: ICD-10-CM

## 2024-02-23 DIAGNOSIS — Z71.89 OTHER SPECIFIED COUNSELING: ICD-10-CM

## 2024-02-23 DIAGNOSIS — F03.90 UNSPECIFIED DEMENTIA WITHOUT BEHAVIORAL DISTURBANCE: ICD-10-CM

## 2024-02-23 LAB
ALBUMIN SERPL ELPH-MCNC: 3.1 G/DL — LOW (ref 3.5–5.2)
ALP SERPL-CCNC: 59 U/L — SIGNIFICANT CHANGE UP (ref 30–115)
ALT FLD-CCNC: 14 U/L — SIGNIFICANT CHANGE UP (ref 0–41)
ANION GAP SERPL CALC-SCNC: 11 MMOL/L — SIGNIFICANT CHANGE UP (ref 7–14)
AST SERPL-CCNC: 22 U/L — SIGNIFICANT CHANGE UP (ref 0–41)
BASOPHILS # BLD AUTO: 0.04 K/UL — SIGNIFICANT CHANGE UP (ref 0–0.2)
BASOPHILS NFR BLD AUTO: 0.4 % — SIGNIFICANT CHANGE UP (ref 0–1)
BILIRUB SERPL-MCNC: 0.5 MG/DL — SIGNIFICANT CHANGE UP (ref 0.2–1.2)
BUN SERPL-MCNC: 40 MG/DL — HIGH (ref 10–20)
CALCIUM SERPL-MCNC: 9 MG/DL — SIGNIFICANT CHANGE UP (ref 8.4–10.5)
CHLORIDE SERPL-SCNC: 116 MMOL/L — HIGH (ref 98–110)
CO2 SERPL-SCNC: 22 MMOL/L — SIGNIFICANT CHANGE UP (ref 17–32)
CREAT SERPL-MCNC: 0.8 MG/DL — SIGNIFICANT CHANGE UP (ref 0.7–1.5)
EGFR: 75 ML/MIN/1.73M2 — SIGNIFICANT CHANGE UP
EOSINOPHIL # BLD AUTO: 0.27 K/UL — SIGNIFICANT CHANGE UP (ref 0–0.7)
EOSINOPHIL NFR BLD AUTO: 2.6 % — SIGNIFICANT CHANGE UP (ref 0–8)
GLUCOSE SERPL-MCNC: 106 MG/DL — HIGH (ref 70–99)
HCT VFR BLD CALC: 32.8 % — LOW (ref 37–47)
HGB BLD-MCNC: 10.6 G/DL — LOW (ref 12–16)
IMM GRANULOCYTES NFR BLD AUTO: 0.5 % — HIGH (ref 0.1–0.3)
LYMPHOCYTES # BLD AUTO: 2.24 K/UL — SIGNIFICANT CHANGE UP (ref 1.2–3.4)
LYMPHOCYTES # BLD AUTO: 21.3 % — SIGNIFICANT CHANGE UP (ref 20.5–51.1)
MAGNESIUM SERPL-MCNC: 2 MG/DL — SIGNIFICANT CHANGE UP (ref 1.8–2.4)
MCHC RBC-ENTMCNC: 30 PG — SIGNIFICANT CHANGE UP (ref 27–31)
MCHC RBC-ENTMCNC: 32.3 G/DL — SIGNIFICANT CHANGE UP (ref 32–37)
MCV RBC AUTO: 92.9 FL — SIGNIFICANT CHANGE UP (ref 81–99)
MONOCYTES # BLD AUTO: 0.47 K/UL — SIGNIFICANT CHANGE UP (ref 0.1–0.6)
MONOCYTES NFR BLD AUTO: 4.5 % — SIGNIFICANT CHANGE UP (ref 1.7–9.3)
NEUTROPHILS # BLD AUTO: 7.47 K/UL — HIGH (ref 1.4–6.5)
NEUTROPHILS NFR BLD AUTO: 70.7 % — SIGNIFICANT CHANGE UP (ref 42.2–75.2)
NRBC # BLD: 0 /100 WBCS — SIGNIFICANT CHANGE UP (ref 0–0)
PLATELET # BLD AUTO: 222 K/UL — SIGNIFICANT CHANGE UP (ref 130–400)
PMV BLD: 11 FL — HIGH (ref 7.4–10.4)
POTASSIUM SERPL-MCNC: 3.4 MMOL/L — LOW (ref 3.5–5)
POTASSIUM SERPL-SCNC: 3.4 MMOL/L — LOW (ref 3.5–5)
PROT SERPL-MCNC: 4.7 G/DL — LOW (ref 6–8)
RBC # BLD: 3.53 M/UL — LOW (ref 4.2–5.4)
RBC # FLD: 13.2 % — SIGNIFICANT CHANGE UP (ref 11.5–14.5)
SODIUM SERPL-SCNC: 149 MMOL/L — HIGH (ref 135–146)
WBC # BLD: 10.54 K/UL — SIGNIFICANT CHANGE UP (ref 4.8–10.8)
WBC # FLD AUTO: 10.54 K/UL — SIGNIFICANT CHANGE UP (ref 4.8–10.8)

## 2024-02-23 PROCEDURE — 99232 SBSQ HOSP IP/OBS MODERATE 35: CPT

## 2024-02-23 PROCEDURE — 99223 1ST HOSP IP/OBS HIGH 75: CPT

## 2024-02-23 RX ORDER — POTASSIUM CHLORIDE 20 MEQ
20 PACKET (EA) ORAL ONCE
Refills: 0 | Status: COMPLETED | OUTPATIENT
Start: 2024-02-23 | End: 2024-02-23

## 2024-02-23 RX ORDER — MORPHINE SULFATE 50 MG/1
2 CAPSULE, EXTENDED RELEASE ORAL ONCE
Refills: 0 | Status: DISCONTINUED | OUTPATIENT
Start: 2024-02-23 | End: 2024-02-23

## 2024-02-23 RX ORDER — MORPHINE SULFATE 50 MG/1
1 CAPSULE, EXTENDED RELEASE ORAL ONCE
Refills: 0 | Status: DISCONTINUED | OUTPATIENT
Start: 2024-02-23 | End: 2024-02-23

## 2024-02-23 RX ORDER — HALOPERIDOL DECANOATE 100 MG/ML
1 INJECTION INTRAMUSCULAR ONCE
Refills: 0 | Status: COMPLETED | OUTPATIENT
Start: 2024-02-23 | End: 2024-02-23

## 2024-02-23 RX ADMIN — SODIUM CHLORIDE 75 MILLILITER(S): 9 INJECTION, SOLUTION INTRAVENOUS at 18:06

## 2024-02-23 RX ADMIN — MORPHINE SULFATE 2 MILLIGRAM(S): 50 CAPSULE, EXTENDED RELEASE ORAL at 05:04

## 2024-02-23 RX ADMIN — SODIUM CHLORIDE 75 MILLILITER(S): 9 INJECTION, SOLUTION INTRAVENOUS at 14:09

## 2024-02-23 RX ADMIN — ENOXAPARIN SODIUM 40 MILLIGRAM(S): 100 INJECTION SUBCUTANEOUS at 11:39

## 2024-02-23 RX ADMIN — MORPHINE SULFATE 2 MILLIGRAM(S): 50 CAPSULE, EXTENDED RELEASE ORAL at 03:27

## 2024-02-23 RX ADMIN — SODIUM CHLORIDE 75 MILLILITER(S): 9 INJECTION, SOLUTION INTRAVENOUS at 11:39

## 2024-02-23 RX ADMIN — SODIUM CHLORIDE 75 MILLILITER(S): 9 INJECTION, SOLUTION INTRAVENOUS at 17:11

## 2024-02-23 RX ADMIN — MORPHINE SULFATE 1 MILLIGRAM(S): 50 CAPSULE, EXTENDED RELEASE ORAL at 22:10

## 2024-02-23 RX ADMIN — Medication 975 MILLIGRAM(S): at 17:11

## 2024-02-23 RX ADMIN — Medication 30 MILLIGRAM(S): at 11:39

## 2024-02-23 RX ADMIN — SODIUM CHLORIDE 75 MILLILITER(S): 9 INJECTION, SOLUTION INTRAVENOUS at 08:17

## 2024-02-23 RX ADMIN — ESCITALOPRAM OXALATE 5 MILLIGRAM(S): 10 TABLET, FILM COATED ORAL at 11:39

## 2024-02-23 RX ADMIN — Medication 50 MILLIEQUIVALENT(S): at 18:05

## 2024-02-23 RX ADMIN — HALOPERIDOL DECANOATE 1 MILLIGRAM(S): 100 INJECTION INTRAMUSCULAR at 21:05

## 2024-02-23 RX ADMIN — Medication 30 MILLIGRAM(S): at 12:13

## 2024-02-23 RX ADMIN — CEFTRIAXONE 100 MILLIGRAM(S): 500 INJECTION, POWDER, FOR SOLUTION INTRAMUSCULAR; INTRAVENOUS at 17:15

## 2024-02-23 RX ADMIN — SODIUM CHLORIDE 75 MILLILITER(S): 9 INJECTION, SOLUTION INTRAVENOUS at 05:04

## 2024-02-23 RX ADMIN — SODIUM CHLORIDE 75 MILLILITER(S): 9 INJECTION, SOLUTION INTRAVENOUS at 03:25

## 2024-02-23 RX ADMIN — MORPHINE SULFATE 1 MILLIGRAM(S): 50 CAPSULE, EXTENDED RELEASE ORAL at 21:43

## 2024-02-23 RX ADMIN — SODIUM CHLORIDE 75 MILLILITER(S): 9 INJECTION, SOLUTION INTRAVENOUS at 11:17

## 2024-02-23 RX ADMIN — SODIUM CHLORIDE 75 MILLILITER(S): 9 INJECTION, SOLUTION INTRAVENOUS at 17:15

## 2024-02-23 RX ADMIN — Medication 30 MILLIGRAM(S): at 01:16

## 2024-02-23 NOTE — SWALLOW BEDSIDE ASSESSMENT ADULT - PHARYNGEAL PHASE
no overt s/s aspiration but high risk for aspiration/Within functional limits Wet vocal quality post oral intake

## 2024-02-23 NOTE — CONSULT NOTE ADULT - ASSESSMENT
78yFemale with history of Alzheimer's sent for poor oral nutrition.  Patient with hypernatremia and fever with leukocytosis on arrival. Palliative care consulted for GOC.    Spoke with patient's daughter over the phone. Palliative care introduced.  She was able to provide a medical history and hospital course. She notes the patient has had significant worsening of her cognitive function and has had poor PO intake. She would not want a PEG for the patient.    We discussed GOC and code status. She confirmed DNR/DNI.  We discussed hospice, and she noted she would want to focus more on comfort when the patient leaves the hospital.  She is interested in a hospice consult. All questions answered.      MEDD (morphine equivalent daily dose):    Education about palliative care provided to patient/family.  See Recs below.    Please call [phone] with questions or concerns 24/7.   We will continue to follow.

## 2024-02-23 NOTE — DIETITIAN INITIAL EVALUATION ADULT - ORAL INTAKE PTA/DIET HISTORY
as per family at bedside pt consume a soft diet with a noted decline in po intake over last few days PTA. pt has been underweight for the last few years. NKFA or intolerances noted. family requesting hospice consult    presently on a puree diet with mildly thick fluids as per SLP JEFE christine to monitor tolerance to po diet. family does not want EN as per family at bedside pt consume a soft diet with a noted decline in po intake over last few days PTA. pt has been underweight for the last few years. NKFA or intolerances noted. family requesting hospice consult    presently on a puree diet with mildly thick fluids as per SLP eval, pt tolerated dinner well consumed >50% of meals. family does not want EN

## 2024-02-23 NOTE — PROGRESS NOTE ADULT - ASSESSMENT
79yo female with history of advanced Alzheimer's dementia, is sent to the ER due to not taking oral nutrition for last several days. Daughter also noted increased contractures. No fevers or chills     Adult Failure to thrive with poor oral intake  Marked dehydration with hypernatremia, hypercalcemia, increased anion gap - IV fluids to be adjusted as clinically indicated   Fever with leukocytosis (sepsis) - for now continue Rocephin started in the ER  Alzheimer's now with agitation, could have element of a metabolic encephalopathy related to above listed issues. Family to bring in meds for verification. Monitor while above issues are addressed   increased contractures - monitor     Assessment:     - Rocephin for urosepsis. F/u ucx and bl cx  - Keep NPO, did not pass speech eval  - Hypernatremia improving with D5 + 1/2 NS. Daily labs   - Pain control for contraction. No need for PT at this point   - F/u palliative c/s    DVT ppx: lovenox   Dispo: acute, pending ucx and bl cx and palliative eval

## 2024-02-23 NOTE — SWALLOW BEDSIDE ASSESSMENT ADULT - SLP PERTINENT HISTORY OF CURRENT PROBLEM
79yo female with history of advanced Alzheimer's dementia, is sent to the ER due to not taking oral nutrition for last several days. Daughter also noted increased contractures. No fevers or chills
77yo female with history of advanced Alzheimer's dementia, is sent to the ER due to not taking oral nutrition for last several days. Daughter also noted increased contractures. No fevers or chills

## 2024-02-23 NOTE — SWALLOW BEDSIDE ASSESSMENT ADULT - SWALLOW EVAL: DIAGNOSIS
Moderate oral dysphagia and high risk for pharyngeal dysphagia. + toleration observed without overt symptoms of penetration/aspiration for PUree/mildly thick
Unable to recommend PO diet. Pt demonstrates mod-severe oral dysphagia during PO trials and suspected pharyngeal dysphagia for thin liquids.

## 2024-02-23 NOTE — PROGRESS NOTE ADULT - SUBJECTIVE AND OBJECTIVE BOX
Hospital Day:  2d    Subjective:    Patient is a 78y old  Female who presents with a chief complaint of failure to thrive. More alert this morning. Dtr at bedside     Past Medical Hx:   Alzheimer's dementia      Past Sx:    Allergies:  No Known Allergies    Current Meds:   Standng Meds:  cefTRIAXone   IVPB 1000 milliGRAM(s) IV Intermittent every 24 hours  dextrose 5% + sodium chloride 0.45%. 1000 milliLiter(s) (40 mL/Hr) IV Continuous <Continuous>  escitalopram 5 milliGRAM(s) Oral daily    PRN Meds:    HOME MEDICATIONS:  Lexapro 10 mg oral tablet: 1 tab(s) orally  Rexulti 0.25 mg oral tablet: 1 tab(s) orally      Vital Signs:   T(F): 97.2 (02-22-24 @ 05:32), Max: 101.8 (02-21-24 @ 12:45)  HR: 80 (02-22-24 @ 05:32) (66 - 121)  BP: 93/57 (02-22-24 @ 05:32) (93/57 - 143/92)  RR: 18 (02-22-24 @ 05:32) (18 - 20)  SpO2: 96% (02-22-24 @ 05:32) (93% - 96%)        Physical Exam:   GENERAL: Frail, unkempt, malnourished, elderly, cachectic   HEENT: cataract on L eye, R eye closed vs enucleation   CHEST/LUNG: CTAB  HEART: Regular rate and rhythm; s1 s2 appreciated, No murmurs, rubs, or gallops  ABDOMEN: Soft, Nontender, Nondistended; Bowel sounds present  EXTREMITIES: No LE edema b/l  SKIN: no rashes, no new lesions  NERVOUS SYSTEM:  Alert & Oriented X1        Labs:                         15.1   20.79 )-----------( 413      ( 21 Feb 2024 13:40 )             44.5     Neutophil% 86.0, Lymphocyte% 9.4, Monocyte% 4.1, Bands% 0.4 02-21-24 @ 13:40    21 Feb 2024 21:31    155    |  120    |  57     ----------------------------<  108    3.6     |  23     |  1.3      Ca    9.9        21 Feb 2024 21:31    TPro  6.8    /  Alb  4.4    /  TBili  0.9    /  DBili  x      /  AST  22     /  ALT  19     /  AlkPhos  72     21 Feb 2024 13:40       pTT    31.6             ----< 1.32 INR  (02-21-24 @ 13:40)    15.10        PT                Urinalysis Basic - ( 21 Feb 2024 21:31 )    Color: x / Appearance: x / SG: x / pH: x  Gluc: 108 mg/dL / Ketone: x  / Bili: x / Urobili: x   Blood: x / Protein: x / Nitrite: x   Leuk Esterase: x / RBC: x / WBC x   Sq Epi: x / Non Sq Epi: x / Bacteria: x

## 2024-02-23 NOTE — SWALLOW BEDSIDE ASSESSMENT ADULT - SWALLOW EVAL: RECOMMENDED FEEDING/EATING TECHNIQUES
allow for swallow between intakes/check mouth frequently for oral residue/pocketing/oral hygiene/position upright (90 degrees)/small sips/bites

## 2024-02-23 NOTE — SWALLOW BEDSIDE ASSESSMENT ADULT - SLP GENERAL OBSERVATIONS
Pt received in bed asleep, woke to repeated verbal stimulus and bed raise. Oral cavity characterized by excessive dried blood on her palate, teeth, and tongue.
Pt received in bed asleep, woke to repeated verbal stimulus and bed raise. Oral cavity characterized by excessive dried secretions on her palate, teeth, and tongue.

## 2024-02-23 NOTE — SWALLOW BEDSIDE ASSESSMENT ADULT - ASR SWALLOW DENTITION
[facility] EMERGENCY DEPARTMENT ENCOUNTER    Basic Information  Patient: Javed Cobos Age: 39 year old Sex: male  MRN: [id number] Encounter Date: 2:32 PM      The patient was endorsed to me by Dr. Amber Chapman at 1400, pending crisis eval for SI.    ED Course  Vitals:    12/10/21 1006 12/10/21 1007 12/10/21 1136 12/10/21 1411   BP:   (!) 186/99 (!) 177/104   BP Location:       Patient Position:       Pulse:  97     Resp:       Temp:       TempSrc:       SpO2: 96%  96% 96%       Pt is resting comfortably with no new complaints.      No orders to display     Seen by crisis, no longer SI. Sobering up, no signs of withdrawal. Wife here to take him home, patient appropriate and cleared for dc      Impression and Plan    Diagnosis:  1. Alcoholic intoxication without complication (CMS/HCC)    2. Suicidal ideation          Condition:  STABLE      Disposition:  Discharge 12/10/2021  7:52 PM  Javed Cobos discharge to home/self care.          Follow Up:  No Pcp             New Prescriptions    No medications on file           Discharge Instructions were provided    Return to the ER or call 911 if you do have thoughts of self harm.      Pt is discharged in good condition.    DISCUSSION OF PLAN AND IMPORTANCE OF FOLLOW-UP CARE:  The plan was discussed verbally and key components were summarized in the discharge instructions. All questions were answered. In discussing management and follow-up, they are advised that the plan is based only on information that was available at the time of emergency department evaluation. Additional testing and treatment may be necessary, and outpatient evaluation is frequently required to ensure complete care. At the same time, there is always potential for symptoms to recur or worsen, or for additional signs or symptoms to develop that could substantially affect the treatment plan. If any new or uncontrolled symptoms occur, or if there are any other concerns, they are advised to 
seek medical evaluation immediately.      Counseled:  Patient, Regarding diagnosis, Regarding diagnostic results, Regarding treatment, and Patient understood      Aracely Savage MD, 12/10/2021 2:32 PM       Aracely Savage MD  12/10/21 1953    
incomplete
incomplete

## 2024-02-23 NOTE — SWALLOW BEDSIDE ASSESSMENT ADULT - NS SPL SWALLOW CLINIC TRIAL FT
Poor oral containment for thins with increased risk for aspiration
Pt demonstrated mod-severe oral dysphagia. Pt attempted to take from dry spoon, weak and incoordinated labial seal. Oral dysphagia for thin liquids characterized by reduced oral grading and anterior-posterior movement, and liquid falling into left and right lateral sulcis. Suspected pharyngeal dysphagia characterized by weak cough attempt, wet vocal quality post intake, and multiple swallows.

## 2024-02-23 NOTE — DIETITIAN INITIAL EVALUATION ADULT - PERTINENT MEDS FT
MEDICATIONS  (STANDING):  cefTRIAXone   IVPB 1000 milliGRAM(s) IV Intermittent every 24 hours  dextrose 5% + sodium chloride 0.45%. 1000 milliLiter(s) (75 mL/Hr) IV Continuous <Continuous>  enoxaparin Injectable 40 milliGRAM(s) SubCutaneous every 24 hours  escitalopram 5 milliGRAM(s) Oral daily    MEDICATIONS  (PRN):  acetaminophen  Suppository .. 325 milliGRAM(s) Rectal every 6 hours PRN Mild Pain (1 - 3)  ketorolac   Injectable 30 milliGRAM(s) IV Push every 8 hours PRN Severe Pain (7 - 10)

## 2024-02-23 NOTE — DIETITIAN INITIAL EVALUATION ADULT - PERTINENT LABORATORY DATA
02-23    149<H>  |  116<H>  |  40<H>  ----------------------------<  106<H>  3.4<L>   |  22  |  0.8    Ca    9.0      23 Feb 2024 08:51  Mg     2.0     02-23    TPro  4.7<L>  /  Alb  3.1<L>  /  TBili  0.5  /  DBili  x   /  AST  22  /  ALT  14  /  AlkPhos  59  02-23   02-23    149<H>  |  116<H>  |  40<H>  ----------------------------<  106<H>  3.4<L>   |  22  |  0.8    Ca    9.0      23 Feb 2024 08:51  Mg     2.0     02-23    TPro  4.7<L>  /  Alb  3.1<L>  /  TBili  0.5  /  DBili  x   /  AST  22  /  ALT  14  /  AlkPhos  59  02-23                          10.6   10.54 )-----------( 222      ( 23 Feb 2024 08:51 )             32.8

## 2024-02-23 NOTE — SWALLOW BEDSIDE ASSESSMENT ADULT - ORAL PREPARATORY PHASE
Refuses to accept bolus into oral cavity/Reduced oral grading/Anterior loss of bolus/Bolus falls into left lateral sulci/Bolus falls into right lateral sulci improved oral containment for mildly thick compared to thins/Anterior loss of bolus

## 2024-02-23 NOTE — CONSULT NOTE ADULT - PROBLEM SELECTOR RECOMMENDATION 2
Poor PO intake and aspiration risk  -family does not want PEG    Recommend non-pharmacological interventions to prevent/treat delirium  - maintain day/night light cycles  - optimize sleep-wake cycle, minimize environmental noise  - reorientation frequently  - use verbal redirection as first line  - minimize restraints and lines  - ensure good bladder/bowel function  - ensure adequate pain control  - minimize use of anticholinergic, antihistaminic, and benzodiazepine medications

## 2024-02-23 NOTE — PROGRESS NOTE ADULT - SUBJECTIVE AND OBJECTIVE BOX
Hospital Day:  2d    Subjective:    Patient is a 78y old  Female who presents with a chief complaint of     Past Medical Hx:   Alzheimer's dementia      Past Sx:    Allergies:  No Known Allergies    Current Meds:   Standng Meds:  cefTRIAXone   IVPB 1000 milliGRAM(s) IV Intermittent every 24 hours  dextrose 5% + sodium chloride 0.45%. 1000 milliLiter(s) (75 mL/Hr) IV Continuous <Continuous>  enoxaparin Injectable 40 milliGRAM(s) SubCutaneous every 24 hours  escitalopram 5 milliGRAM(s) Oral daily    PRN Meds:  acetaminophen  Suppository .. 325 milliGRAM(s) Rectal every 6 hours PRN Mild Pain (1 - 3)  ketorolac   Injectable 30 milliGRAM(s) IV Push every 8 hours PRN Severe Pain (7 - 10)    HOME MEDICATIONS:  Lexapro 10 mg oral tablet: 1 tab(s) orally  Rexulti 0.25 mg oral tablet: 1 tab(s) orally      Vital Signs:   T(F): 97.2 (02-23-24 @ 05:28), Max: 97.2 (02-23-24 @ 05:28)  HR: 70 (02-23-24 @ 05:28) (70 - 83)  BP: 110/67 (02-23-24 @ 05:28) (99/68 - 110/67)  RR: 20 (02-23-24 @ 05:28) (18 - 20)  SpO2: 96% (02-22-24 @ 20:12) (90% - 96%)        Physical Exam:   GENERAL: NAD  HEENT: NCAT  CHEST/LUNG: CTAB  HEART: Regular rate and rhythm; s1 s2 appreciated, No murmurs, rubs, or gallops  ABDOMEN: Soft, Nontender, Nondistended; Bowel sounds present  EXTREMITIES: No LE edema b/l  SKIN: no rashes, no new lesions  NERVOUS SYSTEM:  Alert & Oriented X3  LINES/CATHETERS:        Labs:                         11.1   13.85 )-----------( 247      ( 22 Feb 2024 09:16 )             34.0       22 Feb 2024 09:16    153    |  119    |  50     ----------------------------<  114    3.6     |  24     |  0.9      Ca    9.4        22 Feb 2024 09:16    TPro  4.9    /  Alb  3.2    /  TBili  0.4    /  DBili  x      /  AST  21     /  ALT  14     /  AlkPhos  52     22 Feb 2024 09:16                    Urinalysis Basic - ( 22 Feb 2024 09:16 )    Color: x / Appearance: x / SG: x / pH: x  Gluc: 114 mg/dL / Ketone: x  / Bili: x / Urobili: x   Blood: x / Protein: x / Nitrite: x   Leuk Esterase: x / RBC: x / WBC x   Sq Epi: x / Non Sq Epi: x / Bacteria: x          Culture - Blood (collected 02-21-24 @ 13:40)  Source: .Blood Blood-Peripheral  Preliminary Report (02-22-24 @ 23:02):    No growth at 24 hours    Culture - Blood (collected 02-21-24 @ 13:40)  Source: .Blood Blood-Peripheral  Preliminary Report (02-22-24 @ 23:02):    No growth at 24 hours

## 2024-02-23 NOTE — CHART NOTE - NSCHARTNOTEFT_GEN_A_CORE
Called by RN as patient is screaming despite dose of Toradol. She is unable to verbalize any relevant information so am concerned about pain. She actually was doing this when I first saw her in the ER and at that time morphine was administered, will try same here

## 2024-02-23 NOTE — DIETITIAN INITIAL EVALUATION ADULT - ADD RECOMMEND
RD to monitor tolerance to po diet, labs/meds, NFPF and f/u as needed within 5-7 days  moderate risk

## 2024-02-23 NOTE — SWALLOW BEDSIDE ASSESSMENT ADULT - NS ASR SWALLOW FINDINGS DISCUS
RN FRANNY Covarrubias, MD Arroyo, family member at b/s/Physician/Nursing/Patient/Family
MACK Westfall, family member at b/s/Nursing/Patient/Family

## 2024-02-23 NOTE — SWALLOW BEDSIDE ASSESSMENT ADULT - SWALLOW EVAL: PATIENT/FAMILY GOALS STATEMENT
DNR/DNI/NO tube feeds. Spoke with daughter at the bedside who again stated GOC are no tube feeds and more comfort focused
DNR/DNI

## 2024-02-23 NOTE — CONSULT NOTE ADULT - CONVERSATION DETAILS
Spoke with patient's daughter over the phone. Palliative care introduced.  She was able to provide a medical history and hospital course. She notes the patient has had significant worsening of her cognitive function and has had poor PO intake. She would not want a PEG for the patient.    We discussed GOC and code status. She confirmed DNR/DNI.  We discussed hospice, and she noted she would want to focus more on comfort when the patient leaves the hospital.  She is interested in a hospice consult. All questions answered.

## 2024-02-23 NOTE — DIETITIAN INITIAL EVALUATION ADULT - OTHER INFO
pt is 78 year old female with hx of advanced Alzheimer's, presents with poor po intake for several days admitted with failure to thrive with marked dehydration fever with leukocytosis.

## 2024-02-23 NOTE — CHART NOTE - NSCHARTNOTEFT_GEN_A_CORE
PALLIATIVE MEDICINE INTERDISCIPLINARY TEAM NOTE    Provider:                Family or contact name / phone #     Met with: [  x ] Patient  [   ] Family  [   ] Other:    Primary Language: [ x  ] English [   ] Other*:                      *Interpretation provided by:    SUPPORT DIAGNOSES            (Check all that apply)  [  x ] Spiritual assessment  [   ] EOL issues  [   ] Cultural / spiritual concerns  [   ] Pain / suffering  [   ] Dementia / AMS  [   ] Other:  [   ] AD issues  [   ] Grief / loss / sadness  [   ] Discharge issues  [   ] Distress / coping    SPIRITUAL ASSESSMENT    [ x ] Initial Assessment            [   ] Reassessment          [   ] Not Applicable this visit    Pain/suffering acuity:  [   ] None to mild (0-3)           [ xModerate (4-6)        [   ] High (7-10)    Coping:  [   ] Coping well                     [   ] Coping w/difficulty            [ x  ] Poor coping    Support system:  [   ] Strong                              [   ] Adequate                        [   ] Inadequate    Jainism/Spiritual practice: __Ctholic_________________________    Role of organized Jew:  [   ] Important                     [   ] Some (fam tradition, cultural)               [   ] None    Effects on medical care:  [   ] Yes, _____________________________________                         [   ] None    Cultural/Holiness need:  [   ] Yes, _____________________________________                         [   ] None    Refer to Pastoral Care:  [   ] Yes           [ x  ] No, not at this time    SERVICE PROVIDED  [   ]PSSA                                                                             [   ]Discharge support / facilitation  [   ]AD / goals of care counseling                                  [   ]EOL / death / bereavement counseling  [   ]Counseling / support                                                [   ] Family meeting  [ x  ]Prayer / sacrament / ritual                                      [   ] Referral   [   ]Other                                                                       NOTE and Plan of Care (PoC): no assessment was done. Pt not in a conversation mood. I will follow up.

## 2024-02-23 NOTE — PROGRESS NOTE ADULT - ASSESSMENT
77yo female with history of advanced Alzheimer's dementia, is sent to the ER due to not taking oral nutrition for last several days. Daughter also noted increased contractures. No fevers or chills     ·	Adult Failure to thrive with poor oral intake  ·	Marked dehydration with hypernatremia, hypercalcemia, increased anion gap - IV fluids to be adjusted as clinically indicated   ·	Fever with leukocytosis (sepsis) - for now continue Rocephin started in the ER  ·	Alzheimer's now with agitation, could have element of a metabolic encephalopathy related to above listed issues. Family to bring in meds for verification. Monitor while above issues are addressed   ·	increased contractures - monitor     Assessment:     - Rocephin for urosepsis. Ucx ecoli pending sensitivities. F/u bl cx  - Seen by speech and swallow: puree  - Hypernatremia resolving with D5 + 1/2 NS. Daily labs   - Pain control for contraction. No need for PT at this point   - F/u palliative c/s    DVT ppx: lovenox   Dispo: acute, pending ucx sensitivities for abx tailoring

## 2024-02-23 NOTE — CONSULT NOTE ADULT - SUBJECTIVE AND OBJECTIVE BOX
CC:  poor PO intake    HPI:  79yo female with history of advanced Alzheimer's dementia, is sent to the ER due to not taking oral nutrition for last several days. Daughter also noted increased contractures. No fevers or chills  (21 Feb 2024 19:33)    PERTINENT PM/SXH:   Alzheimer's dementia        FAMILY HISTORY:  No pertinent    ITEMS NOT CHECKED ARE NOT PRESENT    SOCIAL HISTORY:   Significant other/partner[ ]  Children[ ]  Jew/Spirituality:  Substance hx:  [ ]   Tobacco hx:  [ ]   Alcohol hx: [ ]   Living Situation: [x]Home  [ ]Long term care  [ ]Rehab [ ]Other  Home Services: [ ] HHA [ ] Visting RN [ ] Hospice  Occupation:  Home Opioid hx:  [ ] Y [ ] N [ x] I-Stop Reference No:    Reference #: [id number] - no meds     ADVANCE DIRECTIVES:     [ ] Full Code [ x] DNR  MOLST  [ ]  Living Will  [ ]   DECISION MAKER(s):  [ ] Health Care Proxy(s)  [x ] Surrogate(s)  [ ] Guardian           Name(s): Phone Number(s): daughter Radha      BASELINE (I)ADL(s) (prior to admission):    [place]: [ ]Total  [ ] Moderate [ ]Dependent  Palliative Performance Status Version 2:         %    http://npcrc.org/files/news/palliative_performance_scale_ppsv2.pdf    Allergies    No Known Allergies    Intolerances    MEDICATIONS  (STANDING):  cefTRIAXone   IVPB 1000 milliGRAM(s) IV Intermittent every 24 hours  dextrose 5% + sodium chloride 0.45%. 1000 milliLiter(s) (75 mL/Hr) IV Continuous <Continuous>  enoxaparin Injectable 40 milliGRAM(s) SubCutaneous every 24 hours  escitalopram 5 milliGRAM(s) Oral daily    MEDICATIONS  (PRN):  acetaminophen  Suppository .. 325 milliGRAM(s) Rectal every 6 hours PRN Mild Pain (1 - 3)  ketorolac   Injectable 30 milliGRAM(s) IV Push every 8 hours PRN Severe Pain (7 - 10)    PRESENT SYMPTOMS: [ ]Unable to obtain due to poor mentation   Source if other than patient:  [ ]Family   [ ]Team     Pain: [ ]yes [ ]no  QOL impact -   Location -                    Aggravating factors -  Quality -  Radiation -  Timing-  Severity (0-10 scale):  Minimal acceptable level (0-10 scale):     CPOT:    https://www.sccm.org/getattachment/[email or web address]/Critical-Care-Pain-Observation-Tool-(CPOT)    PAIN AD Score:   http://geriatrictoolkit.[facility]/cog/painad.pdf (press ctrl +  left click to view)    Dyspnea:                           [ ]None[ ]Mild [ ]Moderate [ ]Severe     Respiratory Distress Observation Scale (RDOS):   A score of 0 to 2 signifies little or no respiratory distress, 3 signifies mild distress, scores 4 to 6 indicate moderate distress, and scores greater than 7 signify severe distress  https://www.[facility].ca/sites/default/files/PDFS/975545-vapsrxopftz-wygtkixw-xktcpooyxet-tkbjz.pdf    Anxiety:                             [ ]None[ ]Mild [ ]Moderate [ ]Severe   Fatigue:                             [ ]None[ ]Mild [ ]Moderate [ ]Severe   Nausea:                             [ ]None[ ]Mild [ ]Moderate [ ]Severe   Loss of appetite:              [ ]None[ ]Mild [ ]Moderate [ ]Severe   Constipation:                    [ ]None[ ]Mild [ ]Moderate [ ]Severe    Other Symptoms:  [ ]All other review of systems negative     Palliative Performance Status Version 2:         %    http://[facility].org/files/news/palliative_performance_scale_ppsv2.pdf    PHYSICAL EXAM:  Vital Signs Last 24 Hrs  T(C): 36.2 (23 Feb 2024 05:28), Max: 36.2 (23 Feb 2024 05:28)  T(F): 97.2 (23 Feb 2024 05:28), Max: 97.2 (23 Feb 2024 05:28)  HR: 70 (23 Feb 2024 05:28) (70 - 73)  BP: 110/67 (23 Feb 2024 05:28) (99/68 - 110/67)  BP(mean): --  RR: 20 (23 Feb 2024 05:28) (18 - 20)  SpO2: 96% (22 Feb 2024 20:12) (90% - 96%)    Parameters below as of 22 Feb 2024 20:12  Patient On (Oxygen Delivery Method): room air     I&O's Summary      GENERAL:  [ ] No acute distress [ ]Lethargic  [ ]Unarousable  [ ]Verbal  [ ]Non-Verbal [ ]Cachexia    BEHAVIORAL/PSYCH:  [ ]Alert and Oriented x  [ ] Anxiety [ ] Delirium [ ] Agitation [ ] Calm   EYES: [ ] No scleral icterus [ ] Scleral icterus [ ] Closed  ENMT:  [ ]Dry mouth  [ ]No external oral lesions [ ] No external ear or nose lesions  CARDIOVASCULAR:  [ ]Regular [ ]Irregular [ ]Tachy [ ]Not Tachy  [ ]Michele [ ] Edema [ ] No edema  PULMONARY:  [ ]Tachypnea  [ ]Audible excessive secretions [ ] No labored breathing [ ] labored breathing  GASTROINTESTINAL: [ ]Soft  [ ]Distended  [ ]Not distended [ ]Non tender [ ]Tender  MUSCULOSKELETAL: [ ]No clubbing [ ] clubbing  [ ] No cyanosis [ ] cyanosis  NEUROLOGIC: [ ]No focal deficits  [ ]Follows commands  [ ]Does not follow commands  [ ]Cognitive impairment  [ ]Dysphagia  [ ]Dysarthria  [ ]Paresis   SKIN: [ ] Jaundiced [ ] Non-jaundiced [ ]Rash [ ]No Rash [ ] Warm [ ] Dry  MISC/LINES: [ ] ET tube [ ] Trach [ ]NGT/OGT [ ]PEG [ ]Guillory    LABS: reviewed by me                        10.6   10.54 )-----------( 222      ( 23 Feb 2024 08:51 )             32.8   02-23    149<H>  |  116<H>  |  40<H>  ----------------------------<  106<H>  3.4<L>   |  22  |  0.8    Ca    9.0      23 Feb 2024 08:51  Mg     2.0     02-23    TPro  4.7<L>  /  Alb  3.1<L>  /  TBili  0.5  /  DBili  x   /  AST  22  /  ALT  14  /  AlkPhos  59  02-23  PT/INR - ( 21 Feb 2024 13:40 )   PT: 15.10 sec;   INR: 1.32 ratio         PTT - ( 21 Feb 2024 13:40 )  PTT:31.6 sec    Urinalysis Basic - ( 23 Feb 2024 08:51 )    Color: x / Appearance: x / SG: x / pH: x  Gluc: 106 mg/dL / Ketone: x  / Bili: x / Urobili: x   Blood: x / Protein: x / Nitrite: x   Leuk Esterase: x / RBC: x / WBC x   Sq Epi: x / Non Sq Epi: x / Bacteria: x      RADIOLOGY & ADDITIONAL STUDIES: reviewed by me    EKG: reviewed by me      PROTEIN CALORIE MALNUTRITION PRESENT: [ ]mild [ ]moderate [ ]severe [ ]underweight [ ]morbid obesity  https://www.andeal.org/vault/2440/web/files/ONC/Table_Clinical%20Characteristics%20to%20Document%20Malnutrition-White%20JV%20et%20al%202012.pdf    Height (cm): 152.4 (02-21-24 @ 21:25)  Weight (kg): 38.3 (02-21-24 @ 21:25), 35.4 (08-29-23 @ 09:35)  BMI (kg/m2): 16.5 (02-21-24 @ 21:25)  [ ]PPSV2 < or = to 30% [ ]significant weight loss  [ ]poor nutritional intake  [ ]anasarca      [ ]Artificial Nutrition      Palliative Care Spiritual/Emotional Screening Tool Question  Severity (0-4):                    OR                    [ x] Unable to determine. Will assess at later time if appropriate.  Score of 2 or greater indicates recommendation of Chaplaincy and/or SW referral  Chaplaincy Referral: [ ] Yes [ ] Refused [ ] Following     Caregiver [place]:  [ ] Yes [ ] No    OR    [x ] Unable to determine. Will assess at later time if appropriate.  Social Work Referral [ ]  Patient and Family Centered Care Referral [ ]    Anticipatory Grief Present: [ ] Yes [ ] No    OR     [ x] Unable to determine. Will assess at later time if appropriate.  Social Work Referral [ ]  Patient and Family Centered Care Referral [ ]    Patient discussed with primary medical team MD  Palliative care education provided to patient and/or family   CC:  poor PO intake    HPI:  79yo female with history of advanced Alzheimer's dementia, is sent to the ER due to not taking oral nutrition for last several days. Daughter also noted increased contractures. No fevers or chills  (21 Feb 2024 19:33)    PERTINENT PM/SXH:   Alzheimer's dementia        FAMILY HISTORY:  No pertinent    ITEMS NOT CHECKED ARE NOT PRESENT    SOCIAL HISTORY:   Significant other/partner[ ]  Children[ ]  Jain/Spirituality:  Substance hx:  [ ]   Tobacco hx:  [ ]   Alcohol hx: [ ]   Living Situation: [x]Home  [ ]Long term care  [ ]Rehab [ ]Other  Home Services: [ ] HHA [ ] Visting RN [ ] Hospice  Occupation:  Home Opioid hx:  [ ] Y [ ] N [ x] I-Stop Reference No:    Reference #: [id number] - no meds     ADVANCE DIRECTIVES:     [ ] Full Code [ x] DNR  MOLST  [ ]  Living Will  [ ]   DECISION MAKER(s):  [ ] Health Care Proxy(s)  [x ] Surrogate(s)  [ ] Guardian           Name(s): Phone Number(s): daughter Radha      BASELINE (I)ADL(s) (prior to admission):    [place]: [ ]Total  [ ] Moderate [ ]Dependent  Palliative Performance Status Version 2:         %    http://npcrc.org/files/news/palliative_performance_scale_ppsv2.pdf    Allergies    No Known Allergies    Intolerances    MEDICATIONS  (STANDING):  cefTRIAXone   IVPB 1000 milliGRAM(s) IV Intermittent every 24 hours  dextrose 5% + sodium chloride 0.45%. 1000 milliLiter(s) (75 mL/Hr) IV Continuous <Continuous>  enoxaparin Injectable 40 milliGRAM(s) SubCutaneous every 24 hours  escitalopram 5 milliGRAM(s) Oral daily    MEDICATIONS  (PRN):  acetaminophen  Suppository .. 325 milliGRAM(s) Rectal every 6 hours PRN Mild Pain (1 - 3)  ketorolac   Injectable 30 milliGRAM(s) IV Push every 8 hours PRN Severe Pain (7 - 10)    PRESENT SYMPTOMS: [ ]Unable to obtain due to poor mentation   Source if other than patient:  [ ]Family   [ ]Team     Pain: [ ]yes [x ]no  QOL impact -   Location -                    Aggravating factors -  Quality -  Radiation -  Timing-  Severity (0-10 scale):  Minimal acceptable level (0-10 scale):     CPOT:    https://www.sccm.org/getattachment/[email or web address]-[email or web address]-[email or web address]/Critical-Care-Pain-Observation-Tool-(CPOT)    PAIN AD Score:   http://geriatrictoolkit.[facility]/cog/painad.pdf (press ctrl +  left click to view)    Dyspnea:                           [x ]None[ ]Mild [ ]Moderate [ ]Severe     Respiratory Distress Observation Scale (RDOS):   A score of 0 to 2 signifies little or no respiratory distress, 3 signifies mild distress, scores 4 to 6 indicate moderate distress, and scores greater than 7 signify severe distress  https://www.[facility].ca/sites/default/files/PDFS/915027-vbpkcgfyrdc-bmapwvmt-khpssrggsgr-alqcj.pdf    Anxiety:                             [x ]None[ ]Mild [ ]Moderate [ ]Severe   Fatigue:                             [x ]None[ ]Mild [ ]Moderate [ ]Severe   Nausea:                             [x ]None[ ]Mild [ ]Moderate [ ]Severe   Loss of appetite:              [x ]None[ ]Mild [ ]Moderate [ ]Severe   Constipation:                    [x ]None[ ]Mild [ ]Moderate [ ]Severe    Other Symptoms:  [x ]All other review of systems negative     Palliative Performance Status Version 2:         30%    http://[facility].org/files/news/palliative_performance_scale_ppsv2.pdf    PHYSICAL EXAM:  Vital Signs Last 24 Hrs  T(C): 36.2 (23 Feb 2024 05:28), Max: 36.2 (23 Feb 2024 05:28)  T(F): 97.2 (23 Feb 2024 05:28), Max: 97.2 (23 Feb 2024 05:28)  HR: 70 (23 Feb 2024 05:28) (70 - 73)  BP: 110/67 (23 Feb 2024 05:28) (99/68 - 110/67)  BP(mean): --  RR: 20 (23 Feb 2024 05:28) (18 - 20)  SpO2: 96% (22 Feb 2024 20:12) (90% - 96%)    Parameters below as of 22 Feb 2024 20:12  Patient On (Oxygen Delivery Method): room air     I&O's Summary      GENERAL:  [ ] No acute distress [ ]Lethargic  [ ]Unarousable  [x ]Verbal  [ ]Non-Verbal [ ]Cachexia    BEHAVIORAL/PSYCH:  [ x]Alert and Oriented x1-2  [ ] Anxiety [ ] Delirium [ ] Agitation [ x] Calm   EYES: x[ ] No scleral icterus [ ] Scleral icterus [ ] Closed  ENMT:  [ ]Dry mouth  [x ]No external oral lesions [ ] No external ear or nose lesions  CARDIOVASCULAR:  [ ]Regular [ ]Irregular [ ]Tachy [ x]Not Tachy  [ ]Michele [ ] Edema [ ] No edema  PULMONARY:  [ ]Tachypnea  [x ]Audible excessive secretions [ ] No labored breathing [ ] labored breathing  GASTROINTESTINAL: [ ]Soft  [ ]Distended  [ x]Not distended [ ]Non tender [ ]Tender  MUSCULOSKELETAL: [ ]No clubbing [ ] clubbing  [x ] No cyanosis [ ] cyanosis  NEUROLOGIC: [ ]No focal deficits  [ ]Follows commands  [ ]Does not follow commands  [x ]Cognitive impairment  [ ]Dysphagia  [ ]Dysarthria  [ ]Paresis   SKIN: [ ] Jaundiced [x ] Non-jaundiced [ ]Rash [ ]No Rash [ ] Warm [ ] Dry  MISC/LINES: [ ] ET tube [ ] Trach [ ]NGT/OGT [ ]PEG [ ]Guillory    LABS: reviewed by me                        10.6   10.54 )-----------( 222      ( 23 Feb 2024 08:51 )             32.8   02-23    149<H>  |  116<H>  |  40<H>  ----------------------------<  106<H>  3.4<L>   |  22  |  0.8    Ca    9.0      23 Feb 2024 08:51  Mg     2.0     02-23    TPro  4.7<L>  /  Alb  3.1<L>  /  TBili  0.5  /  DBili  x   /  AST  22  /  ALT  14  /  AlkPhos  59  02-23  PT/INR - ( 21 Feb 2024 13:40 )   PT: 15.10 sec;   INR: 1.32 ratio         PTT - ( 21 Feb 2024 13:40 )  PTT:31.6 sec    Urinalysis Basic - ( 23 Feb 2024 08:51 )    Color: x / Appearance: x / SG: x / pH: x  Gluc: 106 mg/dL / Ketone: x  / Bili: x / Urobili: x   Blood: x / Protein: x / Nitrite: x   Leuk Esterase: x / RBC: x / WBC x   Sq Epi: x / Non Sq Epi: x / Bacteria: x      RADIOLOGY & ADDITIONAL STUDIES: reviewed by me    < from: Xray Chest 1 View- PORTABLE-Urgent (02.21.24 @ 14:14) >  Impression:    No focal pulmonary consolidation.    Nodular opacity projecting over the left lung base, unclear representing   nipple shadow or lung nodule. Recommend follow up with outpatient PA and   lateral chest radiographs with nipple markers and bilateral obliques.    < end of copied text >      EKG: reviewed by me    < from: 12 Lead ECG (02.21.24 @ 15:41) >  Ventricular Rate 93 BPM    Atrial Rate 93 BPM    P-R Interval 110 ms    QRS Duration 66 ms    Q-T Interval 356 ms    QTC Calculation(Bazett) 442 ms    P Axis 65 degrees    R Axis -43 degrees    T Axis 81 degrees    Diagnosis Line Sinus rhythm with short WI with Premature atrial complexes  Left axis deviation  Nonspecific ST and T wave abnormality  Abnormal ECG    < end of copied text >      PROTEIN CALORIE MALNUTRITION PRESENT: [ ]mild [ ]moderate [ ]severe [ ]underweight [ ]morbid obesity  https://www.andeal.org/vault/2440/web/files/ONC/Table_Clinical%20Characteristics%20to%20Document%20Malnutrition-White%20JV%20et%20al%396276.pdf    Height (cm): 152.4 (02-21-24 @ 21:25)  Weight (kg): 38.3 (02-21-24 @ 21:25), 35.4 (08-29-23 @ 09:35)  BMI (kg/m2): 16.5 (02-21-24 @ 21:25)  [ ]PPSV2 < or = to 30% [ ]significant weight loss  [ ]poor nutritional intake  [ ]anasarca      [ ]Artificial Nutrition      Palliative Care Spiritual/Emotional Screening Tool Question  Severity (0-4):                    OR                    [ x] Unable to determine. Will assess at later time if appropriate.  Score of 2 or greater indicates recommendation of Chaplaincy and/or SW referral  Chaplaincy Referral: [ ] Yes [ ] Refused [ ] Following     Caregiver [place]:  [ ] Yes [ ] No    OR    [x ] Unable to determine. Will assess at later time if appropriate.  Social Work Referral [ ]  Patient and Family Centered Care Referral [ ]    Anticipatory Grief Present: [ ] Yes [ ] No    OR     [ x] Unable to determine. Will assess at later time if appropriate.  Social Work Referral [ ]  Patient and Family Centered Care Referral [ ]    Patient discussed with primary medical team MD  Palliative care education provided to patient and/or family

## 2024-02-24 LAB
-  AMOXICILLIN/CLAVULANIC ACID: SIGNIFICANT CHANGE UP
-  AMPICILLIN/SULBACTAM: SIGNIFICANT CHANGE UP
-  AMPICILLIN: SIGNIFICANT CHANGE UP
-  AZTREONAM: SIGNIFICANT CHANGE UP
-  CEFAZOLIN: SIGNIFICANT CHANGE UP
-  CEFEPIME: SIGNIFICANT CHANGE UP
-  CEFOXITIN: SIGNIFICANT CHANGE UP
-  CEFTRIAXONE: SIGNIFICANT CHANGE UP
-  CEFUROXIME: SIGNIFICANT CHANGE UP
-  CIPROFLOXACIN: SIGNIFICANT CHANGE UP
-  ERTAPENEM: SIGNIFICANT CHANGE UP
-  GENTAMICIN: SIGNIFICANT CHANGE UP
-  IMIPENEM: SIGNIFICANT CHANGE UP
-  LEVOFLOXACIN: SIGNIFICANT CHANGE UP
-  MEROPENEM: SIGNIFICANT CHANGE UP
-  NITROFURANTOIN: SIGNIFICANT CHANGE UP
-  PIPERACILLIN/TAZOBACTAM: SIGNIFICANT CHANGE UP
-  TOBRAMYCIN: SIGNIFICANT CHANGE UP
-  TRIMETHOPRIM/SULFAMETHOXAZOLE: SIGNIFICANT CHANGE UP
ALBUMIN SERPL ELPH-MCNC: 3 G/DL — LOW (ref 3.5–5.2)
ALP SERPL-CCNC: 61 U/L — SIGNIFICANT CHANGE UP (ref 30–115)
ALT FLD-CCNC: 13 U/L — SIGNIFICANT CHANGE UP (ref 0–41)
ANION GAP SERPL CALC-SCNC: 14 MMOL/L — SIGNIFICANT CHANGE UP (ref 7–14)
AST SERPL-CCNC: 20 U/L — SIGNIFICANT CHANGE UP (ref 0–41)
BASOPHILS # BLD AUTO: 0.03 K/UL — SIGNIFICANT CHANGE UP (ref 0–0.2)
BASOPHILS NFR BLD AUTO: 0.3 % — SIGNIFICANT CHANGE UP (ref 0–1)
BILIRUB SERPL-MCNC: 0.5 MG/DL — SIGNIFICANT CHANGE UP (ref 0.2–1.2)
BUN SERPL-MCNC: 20 MG/DL — SIGNIFICANT CHANGE UP (ref 10–20)
CALCIUM SERPL-MCNC: 8.6 MG/DL — SIGNIFICANT CHANGE UP (ref 8.4–10.5)
CHLORIDE SERPL-SCNC: 110 MMOL/L — SIGNIFICANT CHANGE UP (ref 98–110)
CO2 SERPL-SCNC: 18 MMOL/L — SIGNIFICANT CHANGE UP (ref 17–32)
CREAT SERPL-MCNC: 0.7 MG/DL — SIGNIFICANT CHANGE UP (ref 0.7–1.5)
CULTURE RESULTS: ABNORMAL
EGFR: 88 ML/MIN/1.73M2 — SIGNIFICANT CHANGE UP
EOSINOPHIL # BLD AUTO: 0.36 K/UL — SIGNIFICANT CHANGE UP (ref 0–0.7)
EOSINOPHIL NFR BLD AUTO: 4.1 % — SIGNIFICANT CHANGE UP (ref 0–8)
GLUCOSE SERPL-MCNC: 77 MG/DL — SIGNIFICANT CHANGE UP (ref 70–99)
HCT VFR BLD CALC: 35 % — LOW (ref 37–47)
HGB BLD-MCNC: 11.8 G/DL — LOW (ref 12–16)
IMM GRANULOCYTES NFR BLD AUTO: 0.5 % — HIGH (ref 0.1–0.3)
LYMPHOCYTES # BLD AUTO: 2 K/UL — SIGNIFICANT CHANGE UP (ref 1.2–3.4)
LYMPHOCYTES # BLD AUTO: 22.8 % — SIGNIFICANT CHANGE UP (ref 20.5–51.1)
MCHC RBC-ENTMCNC: 30.9 PG — SIGNIFICANT CHANGE UP (ref 27–31)
MCHC RBC-ENTMCNC: 33.7 G/DL — SIGNIFICANT CHANGE UP (ref 32–37)
MCV RBC AUTO: 91.6 FL — SIGNIFICANT CHANGE UP (ref 81–99)
METHOD TYPE: SIGNIFICANT CHANGE UP
MONOCYTES # BLD AUTO: 0.46 K/UL — SIGNIFICANT CHANGE UP (ref 0.1–0.6)
MONOCYTES NFR BLD AUTO: 5.3 % — SIGNIFICANT CHANGE UP (ref 1.7–9.3)
NEUTROPHILS # BLD AUTO: 5.87 K/UL — SIGNIFICANT CHANGE UP (ref 1.4–6.5)
NEUTROPHILS NFR BLD AUTO: 67 % — SIGNIFICANT CHANGE UP (ref 42.2–75.2)
NRBC # BLD: 0 /100 WBCS — SIGNIFICANT CHANGE UP (ref 0–0)
ORGANISM # SPEC MICROSCOPIC CNT: ABNORMAL
ORGANISM # SPEC MICROSCOPIC CNT: SIGNIFICANT CHANGE UP
PLATELET # BLD AUTO: 213 K/UL — SIGNIFICANT CHANGE UP (ref 130–400)
PMV BLD: 10.8 FL — HIGH (ref 7.4–10.4)
POTASSIUM SERPL-MCNC: 3.6 MMOL/L — SIGNIFICANT CHANGE UP (ref 3.5–5)
POTASSIUM SERPL-SCNC: 3.6 MMOL/L — SIGNIFICANT CHANGE UP (ref 3.5–5)
PROT SERPL-MCNC: 4.6 G/DL — LOW (ref 6–8)
RBC # BLD: 3.82 M/UL — LOW (ref 4.2–5.4)
RBC # FLD: 12.9 % — SIGNIFICANT CHANGE UP (ref 11.5–14.5)
SODIUM SERPL-SCNC: 142 MMOL/L — SIGNIFICANT CHANGE UP (ref 135–146)
SPECIMEN SOURCE: SIGNIFICANT CHANGE UP
WBC # BLD: 8.76 K/UL — SIGNIFICANT CHANGE UP (ref 4.8–10.8)
WBC # FLD AUTO: 8.76 K/UL — SIGNIFICANT CHANGE UP (ref 4.8–10.8)

## 2024-02-24 PROCEDURE — 99232 SBSQ HOSP IP/OBS MODERATE 35: CPT

## 2024-02-24 RX ORDER — SODIUM CHLORIDE 9 MG/ML
1000 INJECTION INTRAMUSCULAR; INTRAVENOUS; SUBCUTANEOUS
Refills: 0 | Status: DISCONTINUED | OUTPATIENT
Start: 2024-02-24 | End: 2024-02-28

## 2024-02-24 RX ORDER — HALOPERIDOL DECANOATE 100 MG/ML
1 INJECTION INTRAMUSCULAR EVERY 12 HOURS
Refills: 0 | Status: DISCONTINUED | OUTPATIENT
Start: 2024-02-24 | End: 2024-03-02

## 2024-02-24 RX ORDER — CIPROFLOXACIN LACTATE 400MG/40ML
400 VIAL (ML) INTRAVENOUS EVERY 12 HOURS
Refills: 0 | Status: COMPLETED | OUTPATIENT
Start: 2024-02-24 | End: 2024-02-27

## 2024-02-24 RX ADMIN — ENOXAPARIN SODIUM 40 MILLIGRAM(S): 100 INJECTION SUBCUTANEOUS at 11:06

## 2024-02-24 RX ADMIN — SODIUM CHLORIDE 75 MILLILITER(S): 9 INJECTION, SOLUTION INTRAVENOUS at 06:25

## 2024-02-24 RX ADMIN — SODIUM CHLORIDE 75 MILLILITER(S): 9 INJECTION INTRAMUSCULAR; INTRAVENOUS; SUBCUTANEOUS at 17:39

## 2024-02-24 RX ADMIN — Medication 200 MILLIGRAM(S): at 17:39

## 2024-02-24 RX ADMIN — SODIUM CHLORIDE 75 MILLILITER(S): 9 INJECTION, SOLUTION INTRAVENOUS at 08:31

## 2024-02-24 RX ADMIN — SODIUM CHLORIDE 75 MILLILITER(S): 9 INJECTION INTRAMUSCULAR; INTRAVENOUS; SUBCUTANEOUS at 17:21

## 2024-02-24 RX ADMIN — SODIUM CHLORIDE 75 MILLILITER(S): 9 INJECTION, SOLUTION INTRAVENOUS at 11:06

## 2024-02-24 NOTE — PROGRESS NOTE ADULT - ASSESSMENT
79yo female with history of advanced Alzheimer's dementia, is sent to the ER due to not taking oral nutrition for last several days. Daughter also noted increased contractures. No fevers or chills     ·	Adult Failure to thrive with poor oral intake  ·	Marked dehydration with hypernatremia, hypercalcemia, increased anion gap - IV fluids to be adjusted as clinically indicated   ·	Fever with leukocytosis (sepsis) - for now continue Rocephin started in the ER  ·	Alzheimer's now with agitation, could have element of a metabolic encephalopathy related to above listed issues. Family to bring in meds for verification. Monitor while above issues are addressed   ·	increased contractures - monitor     Assessment:     - Rocephin for urosepsis. Ucx ecoli pending sensitivities. F/u bl cx  - Seen by speech and swallow: puree  - Hypernatremia resolving with D5 + 1/2 NS. Daily labs   - Pain control for contraction. No need for PT at this point   - F/u palliative c/s    DVT ppx: lovenox   Dispo: acute, pending ucx sensitivities for abx tailoring

## 2024-02-25 LAB
ANION GAP SERPL CALC-SCNC: 12 MMOL/L — SIGNIFICANT CHANGE UP (ref 7–14)
BUN SERPL-MCNC: 10 MG/DL — SIGNIFICANT CHANGE UP (ref 10–20)
CALCIUM SERPL-MCNC: 8.6 MG/DL — SIGNIFICANT CHANGE UP (ref 8.4–10.5)
CHLORIDE SERPL-SCNC: 112 MMOL/L — HIGH (ref 98–110)
CO2 SERPL-SCNC: 18 MMOL/L — SIGNIFICANT CHANGE UP (ref 17–32)
CREAT SERPL-MCNC: 0.7 MG/DL — SIGNIFICANT CHANGE UP (ref 0.7–1.5)
EGFR: 88 ML/MIN/1.73M2 — SIGNIFICANT CHANGE UP
GLUCOSE BLDC GLUCOMTR-MCNC: 73 MG/DL — SIGNIFICANT CHANGE UP (ref 70–99)
GLUCOSE SERPL-MCNC: 82 MG/DL — SIGNIFICANT CHANGE UP (ref 70–99)
MAGNESIUM SERPL-MCNC: 1.7 MG/DL — LOW (ref 1.8–2.4)
POTASSIUM SERPL-MCNC: 3.4 MMOL/L — LOW (ref 3.5–5)
POTASSIUM SERPL-SCNC: 3.4 MMOL/L — LOW (ref 3.5–5)
SODIUM SERPL-SCNC: 142 MMOL/L — SIGNIFICANT CHANGE UP (ref 135–146)

## 2024-02-25 PROCEDURE — 99232 SBSQ HOSP IP/OBS MODERATE 35: CPT

## 2024-02-25 RX ORDER — MORPHINE SULFATE 50 MG/1
2 CAPSULE, EXTENDED RELEASE ORAL ONCE
Refills: 0 | Status: DISCONTINUED | OUTPATIENT
Start: 2024-02-25 | End: 2024-02-25

## 2024-02-25 RX ORDER — MAGNESIUM SULFATE 500 MG/ML
2 VIAL (ML) INJECTION
Refills: 0 | Status: COMPLETED | OUTPATIENT
Start: 2024-02-25 | End: 2024-02-25

## 2024-02-25 RX ORDER — POTASSIUM CHLORIDE 20 MEQ
20 PACKET (EA) ORAL ONCE
Refills: 0 | Status: COMPLETED | OUTPATIENT
Start: 2024-02-25 | End: 2024-02-25

## 2024-02-25 RX ADMIN — Medication 25 GRAM(S): at 16:37

## 2024-02-25 RX ADMIN — ESCITALOPRAM OXALATE 5 MILLIGRAM(S): 10 TABLET, FILM COATED ORAL at 11:24

## 2024-02-25 RX ADMIN — ENOXAPARIN SODIUM 40 MILLIGRAM(S): 100 INJECTION SUBCUTANEOUS at 11:24

## 2024-02-25 RX ADMIN — Medication 25 GRAM(S): at 14:21

## 2024-02-25 RX ADMIN — Medication 50 MILLIEQUIVALENT(S): at 14:21

## 2024-02-25 RX ADMIN — Medication 200 MILLIGRAM(S): at 05:06

## 2024-02-25 RX ADMIN — MORPHINE SULFATE 2 MILLIGRAM(S): 50 CAPSULE, EXTENDED RELEASE ORAL at 11:48

## 2024-02-25 RX ADMIN — MORPHINE SULFATE 2 MILLIGRAM(S): 50 CAPSULE, EXTENDED RELEASE ORAL at 11:23

## 2024-02-25 RX ADMIN — Medication 200 MILLIGRAM(S): at 17:24

## 2024-02-25 RX ADMIN — MORPHINE SULFATE 2 MILLIGRAM(S): 50 CAPSULE, EXTENDED RELEASE ORAL at 06:10

## 2024-02-25 RX ADMIN — MORPHINE SULFATE 2 MILLIGRAM(S): 50 CAPSULE, EXTENDED RELEASE ORAL at 06:14

## 2024-02-25 NOTE — PROGRESS NOTE ADULT - SUBJECTIVE AND OBJECTIVE BOX
Hospital Day:  2d    Subjective:    Patient is a 78y old  Female who presents with a chief complaint of failure to thrive. More alert this morning. Dtr at bedside     Past Medical Hx:   Alzheimer's dementia      Past Sx:    Allergies:  No Known Allergies    Current Meds:   Standng Meds:  cefTRIAXone   IVPB 1000 milliGRAM(s) IV Intermittent every 24 hours  dextrose 5% + sodium chloride 0.45%. 1000 milliLiter(s) (40 mL/Hr) IV Continuous <Continuous>  escitalopram 5 milliGRAM(s) Oral daily    PRN Meds:    HOME MEDICATIONS:  Lexapro 10 mg oral tablet: 1 tab(s) orally  Rexulti 0.25 mg oral tablet: 1 tab(s) orally      Vital Signs:   T(F): 97.2 (02-22-24 @ 05:32), Max: 101.8 (02-21-24 @ 12:45)  HR: 80 (02-22-24 @ 05:32) (66 - 121)  BP: 93/57 (02-22-24 @ 05:32) (93/57 - 143/92)  RR: 18 (02-22-24 @ 05:32) (18 - 20)  SpO2: 96% (02-22-24 @ 05:32) (93% - 96%)        Physical Exam:   GENERAL: Frail, unkempt, malnourished, elderly, cachectic   HEENT: cataract on L eye, R eye closed vs enucleation   CHEST/LUNG: CTAB  HEART: Regular rate and rhythm; s1 s2 appreciated, No murmurs, rubs, or gallops  ABDOMEN: Soft, Nontender, Nondistended; Bowel sounds present  EXTREMITIES: No LE edema b/l  SKIN: no rashes, no new lesions  NERVOUS SYSTEM:  Alert & Oriented X1        Labs:                         15.1   20.79 )-----------( 413      ( 21 Feb 2024 13:40 )             44.5     Neutophil% 86.0, Lymphocyte% 9.4, Monocyte% 4.1, Bands% 0.4 02-21-24 @ 13:40    21 Feb 2024 21:31    155    |  120    |  57     ----------------------------<  108    3.6     |  23     |  1.3      Ca    9.9        21 Feb 2024 21:31    TPro  6.8    /  Alb  4.4    /  TBili  0.9    /  DBili  x      /  AST  22     /  ALT  19     /  AlkPhos  72     21 Feb 2024 13:40       pTT    31.6             ----< 1.32 INR  (02-21-24 @ 13:40)    15.10        PT                Urinalysis Basic - ( 21 Feb 2024 21:31 )    Color: x / Appearance: x / SG: x / pH: x  Gluc: 108 mg/dL / Ketone: x  / Bili: x / Urobili: x   Blood: x / Protein: x / Nitrite: x   Leuk Esterase: x / RBC: x / WBC x   Sq Epi: x / Non Sq Epi: x / Bacteria: x             Hospital Day:  2d    Subjective:    Patient is a 78y old  Female who presents with a chief complaint of failure to thrive. More alert this morning.    Past Medical Hx:   Alzheimer's dementia      Past Sx:    Allergies:  No Known Allergies    Current Meds:   Standng Meds:  cefTRIAXone   IVPB 1000 milliGRAM(s) IV Intermittent every 24 hours  dextrose 5% + sodium chloride 0.45%. 1000 milliLiter(s) (40 mL/Hr) IV Continuous <Continuous>  escitalopram 5 milliGRAM(s) Oral daily    PRN Meds:    HOME MEDICATIONS:  Lexapro 10 mg oral tablet: 1 tab(s) orally  Rexulti 0.25 mg oral tablet: 1 tab(s) orally      Vital Signs:   T(F): 97.2 (02-22-24 @ 05:32), Max: 101.8 (02-21-24 @ 12:45)  HR: 80 (02-22-24 @ 05:32) (66 - 121)  BP: 93/57 (02-22-24 @ 05:32) (93/57 - 143/92)  RR: 18 (02-22-24 @ 05:32) (18 - 20)  SpO2: 96% (02-22-24 @ 05:32) (93% - 96%)        Physical Exam:   GENERAL: Frail, unkempt, malnourished, elderly, cachectic   HEENT: cataract on L eye, R eye closed vs enucleation   CHEST/LUNG: CTAB  HEART: Regular rate and rhythm; s1 s2 appreciated, No murmurs, rubs, or gallops  ABDOMEN: Soft, Nontender, Nondistended; Bowel sounds present  EXTREMITIES: No LE edema b/l  SKIN: no rashes, no new lesions  NERVOUS SYSTEM:  Alert & Oriented X1        Labs:                         15.1   20.79 )-----------( 413      ( 21 Feb 2024 13:40 )             44.5     Neutophil% 86.0, Lymphocyte% 9.4, Monocyte% 4.1, Bands% 0.4 02-21-24 @ 13:40    21 Feb 2024 21:31    155    |  120    |  57     ----------------------------<  108    3.6     |  23     |  1.3      Ca    9.9        21 Feb 2024 21:31    TPro  6.8    /  Alb  4.4    /  TBili  0.9    /  DBili  x      /  AST  22     /  ALT  19     /  AlkPhos  72     21 Feb 2024 13:40       pTT    31.6             ----< 1.32 INR  (02-21-24 @ 13:40)    15.10        PT                Urinalysis Basic - ( 21 Feb 2024 21:31 )    Color: x / Appearance: x / SG: x / pH: x  Gluc: 108 mg/dL / Ketone: x  / Bili: x / Urobili: x   Blood: x / Protein: x / Nitrite: x   Leuk Esterase: x / RBC: x / WBC x   Sq Epi: x / Non Sq Epi: x / Bacteria: x

## 2024-02-25 NOTE — PROGRESS NOTE ADULT - ASSESSMENT
79yo female with history of advanced Alzheimer's dementia, is sent to the ER due to not taking oral nutrition for last several days. Daughter also noted increased contractures. No fevers or chills     ·	Adult Failure to thrive with poor oral intake  ·	Marked dehydration with hypernatremia, hypercalcemia, increased anion gap - IV fluids to be adjusted as clinically indicated   ·	Fever with leukocytosis (sepsis) - for now continue Rocephin started in the ER  ·	Alzheimer's now with agitation, could have element of a metabolic encephalopathy related to above listed issues. Family to bring in meds for verification. Monitor while above issues are addressed   ·	increased contractures - monitor     Assessment:     - Rocephin for urosepsis. Ucx ecoli pending sensitivities. F/u bl cx  - Seen by speech and swallow: puree  - Hypernatremia resolving with D5 + 1/2 NS. Daily labs   - Pain control for contraction. No need for PT at this point   - F/u palliative c/s    DVT ppx: lovenox   Dispo: acute, pending ucx sensitivities for abx tailoring  77yo female with history of advanced Alzheimer's dementia, is sent to the ER due to not taking oral nutrition for last several days. Daughter also noted increased contractures. No fevers or chills     ·	Adult Failure to thrive with poor oral intake  ·	Marked dehydration with hypernatremia, hypercalcemia, increased anion gap, resolving  ·	Fever with leukocytosis (sepsis), resolved   ·	Alzheimer's now with agitation, could have element of a metabolic encephalopathy related to above listed issues  ·	increased contractures - monitor     Assessment:     - Ucx ecoli S to cipro. S/p Rocephin now on cipro to end 2/27. Bl cx NTD  - Seen by speech and swallow: abbey. 1:1 feeds  - IVF   - Pain control for contraction. No need for PT at this point   - Palliative c/s: family interested in hospice. Hospice following, pt will go to [facility] with hospice.     DVT ppx: lovenox   Dispo: Pending [facility] with hospice. Anticipated

## 2024-02-26 LAB
ALBUMIN SERPL ELPH-MCNC: 2.8 G/DL — LOW (ref 3.5–5.2)
ALP SERPL-CCNC: 65 U/L — SIGNIFICANT CHANGE UP (ref 30–115)
ALT FLD-CCNC: 11 U/L — SIGNIFICANT CHANGE UP (ref 0–41)
ANION GAP SERPL CALC-SCNC: 13 MMOL/L — SIGNIFICANT CHANGE UP (ref 7–14)
AST SERPL-CCNC: 14 U/L — SIGNIFICANT CHANGE UP (ref 0–41)
BASOPHILS # BLD AUTO: 0.03 K/UL — SIGNIFICANT CHANGE UP (ref 0–0.2)
BASOPHILS NFR BLD AUTO: 0.3 % — SIGNIFICANT CHANGE UP (ref 0–1)
BILIRUB SERPL-MCNC: 0.5 MG/DL — SIGNIFICANT CHANGE UP (ref 0.2–1.2)
BUN SERPL-MCNC: 6 MG/DL — LOW (ref 10–20)
CALCIUM SERPL-MCNC: 8.4 MG/DL — SIGNIFICANT CHANGE UP (ref 8.4–10.5)
CHLORIDE SERPL-SCNC: 108 MMOL/L — SIGNIFICANT CHANGE UP (ref 98–110)
CO2 SERPL-SCNC: 20 MMOL/L — SIGNIFICANT CHANGE UP (ref 17–32)
CREAT SERPL-MCNC: 0.6 MG/DL — LOW (ref 0.7–1.5)
CULTURE RESULTS: SIGNIFICANT CHANGE UP
CULTURE RESULTS: SIGNIFICANT CHANGE UP
EGFR: 92 ML/MIN/1.73M2 — SIGNIFICANT CHANGE UP
EOSINOPHIL # BLD AUTO: 0.22 K/UL — SIGNIFICANT CHANGE UP (ref 0–0.7)
EOSINOPHIL NFR BLD AUTO: 2 % — SIGNIFICANT CHANGE UP (ref 0–8)
GLUCOSE SERPL-MCNC: 138 MG/DL — HIGH (ref 70–99)
HCT VFR BLD CALC: 35.2 % — LOW (ref 37–47)
HGB BLD-MCNC: 11.5 G/DL — LOW (ref 12–16)
IMM GRANULOCYTES NFR BLD AUTO: 0.5 % — HIGH (ref 0.1–0.3)
LYMPHOCYTES # BLD AUTO: 1.32 K/UL — SIGNIFICANT CHANGE UP (ref 1.2–3.4)
LYMPHOCYTES # BLD AUTO: 12 % — LOW (ref 20.5–51.1)
MAGNESIUM SERPL-MCNC: 2.6 MG/DL — HIGH (ref 1.8–2.4)
MCHC RBC-ENTMCNC: 29.7 PG — SIGNIFICANT CHANGE UP (ref 27–31)
MCHC RBC-ENTMCNC: 32.7 G/DL — SIGNIFICANT CHANGE UP (ref 32–37)
MCV RBC AUTO: 91 FL — SIGNIFICANT CHANGE UP (ref 81–99)
MONOCYTES # BLD AUTO: 0.54 K/UL — SIGNIFICANT CHANGE UP (ref 0.1–0.6)
MONOCYTES NFR BLD AUTO: 4.9 % — SIGNIFICANT CHANGE UP (ref 1.7–9.3)
NEUTROPHILS # BLD AUTO: 8.84 K/UL — HIGH (ref 1.4–6.5)
NEUTROPHILS NFR BLD AUTO: 80.3 % — HIGH (ref 42.2–75.2)
NRBC # BLD: 0 /100 WBCS — SIGNIFICANT CHANGE UP (ref 0–0)
PLATELET # BLD AUTO: 231 K/UL — SIGNIFICANT CHANGE UP (ref 130–400)
PMV BLD: 11.4 FL — HIGH (ref 7.4–10.4)
POTASSIUM SERPL-MCNC: 3.4 MMOL/L — LOW (ref 3.5–5)
POTASSIUM SERPL-SCNC: 3.4 MMOL/L — LOW (ref 3.5–5)
PROT SERPL-MCNC: 4.5 G/DL — LOW (ref 6–8)
RBC # BLD: 3.87 M/UL — LOW (ref 4.2–5.4)
RBC # FLD: 13.1 % — SIGNIFICANT CHANGE UP (ref 11.5–14.5)
SODIUM SERPL-SCNC: 141 MMOL/L — SIGNIFICANT CHANGE UP (ref 135–146)
SPECIMEN SOURCE: SIGNIFICANT CHANGE UP
SPECIMEN SOURCE: SIGNIFICANT CHANGE UP
WBC # BLD: 11 K/UL — HIGH (ref 4.8–10.8)
WBC # FLD AUTO: 11 K/UL — HIGH (ref 4.8–10.8)

## 2024-02-26 PROCEDURE — 99232 SBSQ HOSP IP/OBS MODERATE 35: CPT

## 2024-02-26 RX ADMIN — Medication 200 MILLIGRAM(S): at 05:55

## 2024-02-26 RX ADMIN — Medication 200 MILLIGRAM(S): at 17:36

## 2024-02-26 RX ADMIN — ENOXAPARIN SODIUM 40 MILLIGRAM(S): 100 INJECTION SUBCUTANEOUS at 11:12

## 2024-02-26 RX ADMIN — ESCITALOPRAM OXALATE 5 MILLIGRAM(S): 10 TABLET, FILM COATED ORAL at 11:11

## 2024-02-26 NOTE — DISCHARGE NOTE PROVIDER - ATTENDING DISCHARGE PHYSICAL EXAMINATION:
General: WN/WD NAD  Neurology: A&Ox1, nonfocal, CUETO x 4  Head:  Normocephalic, atraumatic  ENT:  Mucosa moist, no ulcerations  Neck:  Supple, no sinuses or palpable masses  Lymphatic:  No palpable cervical, supraclavicular, axillary or inguinal adenopathy  Respiratory: CTA B/L  CV: RRR, S1S2, no murmur  Abdominal: Soft, NT, ND no palpable mass  MSK: No edema, + peripheral pulses, FROM all 4 extremity  Incisions: intact, no erythema or drainage

## 2024-02-26 NOTE — PROGRESS NOTE ADULT - SUBJECTIVE AND OBJECTIVE BOX
Hospital Day:  2d    Subjective:    Patient is a 78y old  Female who presents with a chief complaint of failure to thrive. C/o of pain but when asked where the pain is pt states she's not in pain     Past Medical Hx:   Alzheimer's dementia      Past Sx:    Allergies:  No Known Allergies    Current Meds:   Standng Meds:  cefTRIAXone   IVPB 1000 milliGRAM(s) IV Intermittent every 24 hours  dextrose 5% + sodium chloride 0.45%. 1000 milliLiter(s) (40 mL/Hr) IV Continuous <Continuous>  escitalopram 5 milliGRAM(s) Oral daily    PRN Meds:    HOME MEDICATIONS:  Lexapro 10 mg oral tablet: 1 tab(s) orally  Rexulti 0.25 mg oral tablet: 1 tab(s) orally      Vital Signs:   T(F): 97.2 (02-22-24 @ 05:32), Max: 101.8 (02-21-24 @ 12:45)  HR: 80 (02-22-24 @ 05:32) (66 - 121)  BP: 93/57 (02-22-24 @ 05:32) (93/57 - 143/92)  RR: 18 (02-22-24 @ 05:32) (18 - 20)  SpO2: 96% (02-22-24 @ 05:32) (93% - 96%)        Physical Exam:   GENERAL: Frail, unkempt, malnourished, elderly, cachectic, dry mucus membranes   HEENT: cataract on L eye, R eye closed vs enucleation   CHEST/LUNG: CTAB  HEART: Regular rate and rhythm; s1 s2 appreciated, No murmurs, rubs, or gallops  ABDOMEN: Soft, Nontender, Nondistended; Bowel sounds present  EXTREMITIES: No LE edema b/l  SKIN: no rashes, no new lesions  NERVOUS SYSTEM:  Alert & Oriented X1        Labs:                         15.1   20.79 )-----------( 413      ( 21 Feb 2024 13:40 )             44.5     Neutophil% 86.0, Lymphocyte% 9.4, Monocyte% 4.1, Bands% 0.4 02-21-24 @ 13:40    21 Feb 2024 21:31    155    |  120    |  57     ----------------------------<  108    3.6     |  23     |  1.3      Ca    9.9        21 Feb 2024 21:31    TPro  6.8    /  Alb  4.4    /  TBili  0.9    /  DBili  x      /  AST  22     /  ALT  19     /  AlkPhos  72     21 Feb 2024 13:40       pTT    31.6             ----< 1.32 INR  (02-21-24 @ 13:40)    15.10        PT                Urinalysis Basic - ( 21 Feb 2024 21:31 )    Color: x / Appearance: x / SG: x / pH: x  Gluc: 108 mg/dL / Ketone: x  / Bili: x / Urobili: x   Blood: x / Protein: x / Nitrite: x   Leuk Esterase: x / RBC: x / WBC x   Sq Epi: x / Non Sq Epi: x / Bacteria: x

## 2024-02-26 NOTE — DISCHARGE NOTE PROVIDER - NSDCCPCAREPLAN_GEN_ALL_CORE_FT
PRINCIPAL DISCHARGE DIAGNOSIS  Diagnosis: Adult failure to thrive  Assessment and Plan of Treatment: continue 1:1 feedings as tolerated. You are being discahrged with hospice care services      SECONDARY DISCHARGE DIAGNOSES  Diagnosis: Acute UTI  Assessment and Plan of Treatment: you were treated with IV antibiotics for your uti.

## 2024-02-26 NOTE — CDI QUERY NOTE - NSCDIOTHERTXTBX_GEN_ALL_CORE_HH
Please clarify if metabolic encephalopathy can be further specified as:  •	Metabolic encephalopathy ruled in  •	Metabolic encephalopathy cannot be ruled out  •	Other (specify)                                                                       Supporting documentation and/or clinical evidence:   H&P Adult-GOC- Attending: … Constitutional Comments agitated … Mental status: awake, confused ….     Progress Note Adult-Hospitalist Attending: … Lethargic this morning …     Progress Note Adult-Hospitalist Attending: …More alert this morning … Alert & Oriented X1 … Marked dehydration with hypernatremia, hypercalcemia, increased anion gap, resolving. Fever with leukocytosis (sepsis), resolved. Alzheimer's now with agitation, could have element of a metabolic encephalopathy related to above listed issues …    Nursing Neuro Assessments:   LOC: confused; lethargic; Arousal level: arouses to touch/gentle shaking   LOC: confused, lethargic; Arousal level: arouses to voice; Mood/behavior: calm   LOC: alert; Arousal level; arouses to voice; Mood/behavior: calm    Imagin/21 CT Head: … 1.  No acute abnormality. 2.  Extensive motion but no definite evidence for hemorrhage. Severe microvascular changes …    In responding to this request, please exercise your independent professional judgement. The fact that a question is asked does not imply that any particular answer is desired or expected. Documentation clarification is required for compliance, accuracy in coding and billing, claim validation and reporting severity of illness, quality data and risk of mortality.    Thank you,  Joyce Walden RN, BSN  [phone]

## 2024-02-26 NOTE — PROGRESS NOTE ADULT - ASSESSMENT
79yo female with history of advanced Alzheimer's dementia, is sent to the ER due to not taking oral nutrition for last several days. Daughter also noted increased contractures. No fevers or chills     ·	Adult Failure to thrive with poor oral intake  ·	Marked dehydration with hypernatremia, hypercalcemia, increased anion gap, resolving  ·	Fever with leukocytosis (sepsis), resolved   ·	Alzheimer's now with agitation, could have element of a metabolic encephalopathy related to above listed issues  ·	increased contractures - monitor     Assessment:     - Ucx ecoli S to cipro. S/p Rocephin now on cipro to end 2/27. Bl cx NTD  - Seen by speech and swallow: abbey. 1:1 feeds  - IVF   - Pain control for contraction. No need for PT at this point   - Palliative c/s: family interested in hospice. Hospice following, pt will go to [facility] with hospice.     DVT ppx: lovenox   Dispo: Pending [facility] with hospice. Anticipated

## 2024-02-26 NOTE — DISCHARGE NOTE PROVIDER - CARE PROVIDER_API CALL
Olman Kiran  Internal Medicine  [address] [place] [address]  Phone: [phone]  Fax: [phone]  Follow Up Time: Routine

## 2024-02-26 NOTE — DISCHARGE NOTE PROVIDER - HOSPITAL COURSE
77yo female with history of advanced Alzheimer's dementia, presented due to not taking oral nutrition for last several days. Patient found to have e coli UTI, treated with rocephin and cipro.     #Adult Failure to thrive with poor oral intake  #Marked dehydration with hypernatremia, hypercalcemia, increased anion gap, resolved  #Fever with leukocytosis (sepsis), resolved   #Alzheimer's with agitation, with some component of metabolic encephalopathy 2/2 underlying infection  - Ucx ecoli S to cipro. S/p Rocephin now on cipro to end 2/27. Bl cx NTD  - Seen by speech and swallow: abbey. 1:1 feeds  - dc to hospice per family wishes      77yo female with history of advanced Alzheimer's dementia, presented due to not taking oral nutrition for last several days. Patient found to have e coli UTI, treated with rocephin and cipro.     #Adult Failure to thrive with poor oral intake  #Marked dehydration with hypernatremia, hypercalcemia, increased anion gap, resolved  #Fever with leukocytosis (sepsis), resolved   #Alzheimer's with agitation  #Metabolic encephalopathy ruled in, multifactorial  - Ucx ecoli S to cipro. S/p Rocephin now on cipro to end 2/27. Bl cx NTD  - Seen by speech and swallow: abbey. 1:1 feeds  - dc to hospice per family wishes

## 2024-02-26 NOTE — DISCHARGE NOTE PROVIDER - NSDCMRMEDTOKEN_GEN_ALL_CORE_FT
Lexapro 10 mg oral tablet: 1 tab(s) orally  Rexulti 0.25 mg oral tablet: 1 tab(s) orally   Lexapro 10 mg oral tablet: 1 tab(s) orally once a day  Rexulti 0.25 mg oral tablet: 1 tab(s) orally

## 2024-02-27 LAB
ANION GAP SERPL CALC-SCNC: 11 MMOL/L — SIGNIFICANT CHANGE UP (ref 7–14)
BUN SERPL-MCNC: 4 MG/DL — LOW (ref 10–20)
CALCIUM SERPL-MCNC: 8.6 MG/DL — SIGNIFICANT CHANGE UP (ref 8.4–10.5)
CHLORIDE SERPL-SCNC: 112 MMOL/L — HIGH (ref 98–110)
CO2 SERPL-SCNC: 17 MMOL/L — SIGNIFICANT CHANGE UP (ref 17–32)
CREAT SERPL-MCNC: 0.7 MG/DL — SIGNIFICANT CHANGE UP (ref 0.7–1.5)
EGFR: 88 ML/MIN/1.73M2 — SIGNIFICANT CHANGE UP
GLUCOSE SERPL-MCNC: 79 MG/DL — SIGNIFICANT CHANGE UP (ref 70–99)
POTASSIUM SERPL-MCNC: 5 MMOL/L — SIGNIFICANT CHANGE UP (ref 3.5–5)
POTASSIUM SERPL-SCNC: 5 MMOL/L — SIGNIFICANT CHANGE UP (ref 3.5–5)
SODIUM SERPL-SCNC: 140 MMOL/L — SIGNIFICANT CHANGE UP (ref 135–146)

## 2024-02-27 RX ORDER — MORPHINE SULFATE 50 MG/1
1 CAPSULE, EXTENDED RELEASE ORAL ONCE
Refills: 0 | Status: DISCONTINUED | OUTPATIENT
Start: 2024-02-27 | End: 2024-02-27

## 2024-02-27 RX ORDER — IBUPROFEN 200 MG
400 TABLET ORAL ONCE
Refills: 0 | Status: COMPLETED | OUTPATIENT
Start: 2024-02-27 | End: 2024-02-27

## 2024-02-27 RX ADMIN — ESCITALOPRAM OXALATE 5 MILLIGRAM(S): 10 TABLET, FILM COATED ORAL at 11:12

## 2024-02-27 RX ADMIN — ENOXAPARIN SODIUM 40 MILLIGRAM(S): 100 INJECTION SUBCUTANEOUS at 11:12

## 2024-02-27 RX ADMIN — MORPHINE SULFATE 1 MILLIGRAM(S): 50 CAPSULE, EXTENDED RELEASE ORAL at 06:26

## 2024-02-27 RX ADMIN — Medication 200 MILLIGRAM(S): at 05:03

## 2024-02-27 RX ADMIN — MORPHINE SULFATE 1 MILLIGRAM(S): 50 CAPSULE, EXTENDED RELEASE ORAL at 06:48

## 2024-02-27 RX ADMIN — SODIUM CHLORIDE 75 MILLILITER(S): 9 INJECTION INTRAMUSCULAR; INTRAVENOUS; SUBCUTANEOUS at 05:01

## 2024-02-27 NOTE — PROGRESS NOTE ADULT - SUBJECTIVE AND OBJECTIVE BOX
Hospital Day:  2d    Subjective:    Patient is a 78y old  Female who presents with a chief complaint of failure to thrive. Denies pain this am, resting comfortably in bed    Past Medical Hx:   Alzheimer's dementia      Past Sx:    Allergies:  No Known Allergies    Current Meds:   Standng Meds:  cefTRIAXone   IVPB 1000 milliGRAM(s) IV Intermittent every 24 hours  dextrose 5% + sodium chloride 0.45%. 1000 milliLiter(s) (40 mL/Hr) IV Continuous <Continuous>  escitalopram 5 milliGRAM(s) Oral daily    PRN Meds:    HOME MEDICATIONS:  Lexapro 10 mg oral tablet: 1 tab(s) orally  Rexulti 0.25 mg oral tablet: 1 tab(s) orally      Vital Signs:   T(F): 97.2 (02-22-24 @ 05:32), Max: 101.8 (02-21-24 @ 12:45)  HR: 80 (02-22-24 @ 05:32) (66 - 121)  BP: 93/57 (02-22-24 @ 05:32) (93/57 - 143/92)  RR: 18 (02-22-24 @ 05:32) (18 - 20)  SpO2: 96% (02-22-24 @ 05:32) (93% - 96%)        Physical Exam:   GENERAL: Frail, unkempt, malnourished, elderly, cachectic, dry mucus membranes   HEENT: cataract on L eye, R eye with droopy eyelid    CHEST/LUNG: CTAB  HEART: Regular rate and rhythm; s1 s2 appreciated, No murmurs, rubs, or gallops  ABDOMEN: Soft, Nontender, Nondistended; Bowel sounds present  EXTREMITIES: No LE edema b/l  SKIN: no rashes, no new lesions  NERVOUS SYSTEM:  Alert & Oriented X1        Labs:                         15.1   20.79 )-----------( 413      ( 21 Feb 2024 13:40 )             44.5     Neutophil% 86.0, Lymphocyte% 9.4, Monocyte% 4.1, Bands% 0.4 02-21-24 @ 13:40    21 Feb 2024 21:31    155    |  120    |  57     ----------------------------<  108    3.6     |  23     |  1.3      Ca    9.9        21 Feb 2024 21:31    TPro  6.8    /  Alb  4.4    /  TBili  0.9    /  DBili  x      /  AST  22     /  ALT  19     /  AlkPhos  72     21 Feb 2024 13:40       pTT    31.6             ----< 1.32 INR  (02-21-24 @ 13:40)    15.10        PT                Urinalysis Basic - ( 21 Feb 2024 21:31 )    Color: x / Appearance: x / SG: x / pH: x  Gluc: 108 mg/dL / Ketone: x  / Bili: x / Urobili: x   Blood: x / Protein: x / Nitrite: x   Leuk Esterase: x / RBC: x / WBC x   Sq Epi: x / Non Sq Epi: x / Bacteria: x

## 2024-02-27 NOTE — CHART NOTE - NSCHARTNOTEFT_GEN_A_CORE
Provider:                                              Met with: [ x  ] Patient  [ x  ] Family  [   ] Other:         Primary Language: [ x  ] English [   ] Other*:                      *Interpretation provided by:         SUPPORT DIAGNOSES            (Check all that apply)         [   ] EOL issues    [ x  ] Advanced Illness    [   ] Cultural / spiritual concerns    [ x  ] Pain / suffering    [   ] Dementia / AMS    [   ] Other:    [   ] AD issues    [   ] Grief / loss / sadness    [   ] Discharge issues    [   ] Distress / coping         PSYCHOSOCIAL ASSESSMENT OF PATIENT         (Check all that apply)         [ X  ] Initial Assessment            [   ] Reassessment          [   ] Not Applicable this visit         Pain/suffering acuity:    [  x ] None to mild (0-3)           [   ] Moderate (4-6)        [   ] High (7-10)         Mental Status:    [   ] Alert/oriented (x3)          [  x ] Confused/Altered(x2/x1)         [   ] Non-resp         Functional status:    [   ] Independent w ADLs      [  x ] Needs Assistance             [   ] Bedbound/Full Care         Coping:    [  x ] Coping well                     [  ] Coping w/difficulty            [   ] Poor coping         Support system:    [  x ] Strong                              [   ] Adequate                        [   ] Inadequate              Past history and medications for:         [ ] Anxiety       [ ] Depression    [ ] Sleep disorders              SERVICE PROVIDED    [   ]Discharge support / facilitation    [ x  ]AD / goals of care counseling                                  [   ]EOL / death / bereavement counseling    [ X  ]Counseling / support                                                [   ] Family meeting    [   ]Prayer / sacrament / ritual                                      [   ] Referral    [   ]Other                                                                           NOTE and Plan of Care (PoC):   78yFemale with history of Alzheimer's sent for poor oral nutrition.  Patient with hypernatremia and fever with leukocytosis on arrival. Palliative care consulted for [facility].  LMSW met with Pt at bedside. Pt presented alert but pleasantly confused. Pt was able to verbalize she was not in any pain at time of assessment. LMSW spoke with Pt's daughter Radha and re-introduced Palliative Care role. Pt's daughter understood. Daughter confirmed speaking with Palliative Care MD and confirmed Pt is pending SNF placement with hospice. Pt's daughter reports that she is working on obtaining all necessary paperwork for financial review. Questions answered and support rendered. Will follow for support. [phone]

## 2024-02-27 NOTE — PROGRESS NOTE ADULT - ASSESSMENT
77yo female with history of advanced Alzheimer's dementia, is sent to the ER due to not taking oral nutrition for last several days. Daughter also noted increased contractures. No fevers or chills     ·	Adult Failure to thrive with poor oral intake  ·	Marked dehydration with hypernatremia, hypercalcemia, increased anion gap, resolved  ·	Fever with leukocytosis (sepsis), resolved   ·	Alzheimer's now with agitation, could have element of a metabolic encephalopathy related to above listed issues, improving   ·	increased contractures - monitor     Assessment:     - Ucx ecoli S to cipro. S/p Rocephin, then cipro, ended 2/27. Bl cx NTD  - Seen by speech and swallow: abbey. 1:1 feeds  - Maintenance fluids   - Pain control for contraction. OOBTC   - Palliative c/s: family interested in hospice. Hospice following, pt will go to [facility] with hospice.     DVT ppx: lovenox   Dispo: Medically ready for dc/ to [facility] with hospice. Anticipated daily 77yo female with history of advanced Alzheimer's dementia, is sent to the ER due to not taking oral nutrition for last several days. Daughter also noted increased contractures. No fevers or chills     ·	Adult Failure to thrive with poor oral intake  ·	Marked dehydration with hypernatremia, hypercalcemia, increased anion gap, resolved  ·	Fever with leukocytosis (sepsis), resolved   ·	Alzheimer's now with agitation, could have element of a metabolic encephalopathy related to above listed issues, improving   ·	increased contractures - monitor     Assessment:     - Ucx ecoli S to cipro. S/p Rocephin, then cipro, ended 2/27. Bl cx NTD  - Seen by speech and swallow: abbey. 1:1 feeds  - Maintenance fluids   - Pain control for contraction. OOBTC   - Palliative c/s: family interested in hospice. Hospice following, pt will go to [facility] with hospice.     DVT ppx: lovenox   Dispo: Medically ready for dc/ to [facility] with hospice. Anticipated daily. Son updated at bedside today

## 2024-02-28 PROCEDURE — 99232 SBSQ HOSP IP/OBS MODERATE 35: CPT

## 2024-02-28 RX ORDER — ESCITALOPRAM OXALATE 10 MG/1
1 TABLET, FILM COATED ORAL
Qty: 0 | Refills: 0 | DISCHARGE

## 2024-02-28 RX ORDER — SODIUM CHLORIDE 9 MG/ML
1000 INJECTION, SOLUTION INTRAVENOUS
Refills: 0 | Status: DISCONTINUED | OUTPATIENT
Start: 2024-02-28 | End: 2024-03-01

## 2024-02-28 RX ADMIN — ESCITALOPRAM OXALATE 5 MILLIGRAM(S): 10 TABLET, FILM COATED ORAL at 10:34

## 2024-02-28 RX ADMIN — SODIUM CHLORIDE 40 MILLILITER(S): 9 INJECTION, SOLUTION INTRAVENOUS at 14:45

## 2024-02-28 RX ADMIN — HALOPERIDOL DECANOATE 1 MILLIGRAM(S): 100 INJECTION INTRAMUSCULAR at 15:15

## 2024-02-28 RX ADMIN — ENOXAPARIN SODIUM 40 MILLIGRAM(S): 100 INJECTION SUBCUTANEOUS at 10:34

## 2024-02-28 NOTE — PROGRESS NOTE ADULT - SUBJECTIVE AND OBJECTIVE BOX
SUBJECTIVE:    Patient is a 78y old Female who presents with a chief complaint of failure to thrive (26 Feb 2024 10:46)      HPI:  79yo female with history of advanced Alzheimer's dementia, is sent to the ER due to not taking oral nutrition for last several days. Daughter also noted increased contractures. No fevers or chills  (21 Feb 2024 19:33)      Currently admitted to medicine with the primary diagnosis of Adult failure to thrive    appears comfortable in bed, denying any pain    Besides the pertinent positives and negatives described above, the ROS was within normal limits.    PAST MEDICAL & SURGICAL HISTORY  Alzheimer's dementia      SOCIAL HISTORY:    ALLERGIES:  No Known Allergies    MEDICATIONS:  STANDING MEDICATIONS  dextrose 5% + sodium chloride 0.45%. 1000 milliLiter(s) IV Continuous <Continuous>  enoxaparin Injectable 40 milliGRAM(s) SubCutaneous every 24 hours  escitalopram 5 milliGRAM(s) Oral daily    PRN MEDICATIONS  acetaminophen  Suppository .. 325 milliGRAM(s) Rectal every 6 hours PRN  haloperidol    Injectable 1 milliGRAM(s) IV Push every 12 hours PRN    VITALS:   T(F): 96  HR: 99  BP: 119/64  RR: 18  SpO2: --    LABS:    02-27    140  |  112<H>  |  4<L>  ----------------------------<  79  5.0   |  17  |  0.7    Ca    8.6      27 Feb 2024 15:23        Urinalysis Basic - ( 27 Feb 2024 15:23 )    Color: x / Appearance: x / SG: x / pH: x  Gluc: 79 mg/dL / Ketone: x  / Bili: x / Urobili: x   Blood: x / Protein: x / Nitrite: x   Leuk Esterase: x / RBC: x / WBC x   Sq Epi: x / Non Sq Epi: x / Bacteria: x                Adult failure to thrive      RADIOLOGY:    PHYSICAL EXAM:  General: WN/WD NAD  Neurology: A&Ox2, nonfocal, CUETO x 4  Head:  Normocephalic, atraumatic  ENT:  Mucosa moist, no ulcerations  Neck:  Supple, no sinuses or palpable masses  Lymphatic:  No palpable cervical, supraclavicular, axillary or inguinal adenopathy  Respiratory: CTA B/L  CV: RRR, S1S2, no murmur  Abdominal: Soft, NT, ND no palpable mass  MSK: No edema, + peripheral pulses  Incisions: intact, no erythema or drainage    Intravenous access: yes   NG tube: no  Guillory Catheter: no

## 2024-02-28 NOTE — PROGRESS NOTE ADULT - ASSESSMENT
79yo female with history of advanced Alzheimer's dementia, is sent to the ER due to not taking oral nutrition for last several days. Daughter also noted increased contractures. No fevers or chills     Assessment    ·	Adult Failure to thrive with poor oral intake  ·	Marked dehydration with hypernatremia, hypercalcemia, increased anion gap, resolved  ·	Fever with leukocytosis (sepsis) likely secondary to UTI (resolved)  ·	Alzheimer's now with agitation, could have element of a metabolic encephalopathy related to above listed issues, improving       Plan  - Ucx ecoli S to cipro. S/p Rocephin, then cipro, ended 2/27. Bl cx NTD  - Seen by speech and swallow: abbey. 1:1 feeds  - Maintenance fluids   - Pain control for contraction. OOBTC   - hospice being set up    DVT ppx: lovenox   Dispo: Medically ready for dc/ to [facility] with hospice. Son updated at bedside today

## 2024-02-29 LAB
ALBUMIN SERPL ELPH-MCNC: 3.9 G/DL — SIGNIFICANT CHANGE UP (ref 3.5–5.2)
ALP SERPL-CCNC: 74 U/L — SIGNIFICANT CHANGE UP (ref 30–115)
ALT FLD-CCNC: 15 U/L — SIGNIFICANT CHANGE UP (ref 0–41)
ANION GAP SERPL CALC-SCNC: 14 MMOL/L — SIGNIFICANT CHANGE UP (ref 7–14)
AST SERPL-CCNC: 20 U/L — SIGNIFICANT CHANGE UP (ref 0–41)
BASOPHILS # BLD AUTO: 0.06 K/UL — SIGNIFICANT CHANGE UP (ref 0–0.2)
BASOPHILS NFR BLD AUTO: 0.6 % — SIGNIFICANT CHANGE UP (ref 0–1)
BILIRUB SERPL-MCNC: 0.5 MG/DL — SIGNIFICANT CHANGE UP (ref 0.2–1.2)
BUN SERPL-MCNC: 4 MG/DL — LOW (ref 10–20)
CALCIUM SERPL-MCNC: 9.5 MG/DL — SIGNIFICANT CHANGE UP (ref 8.4–10.5)
CHLORIDE SERPL-SCNC: 109 MMOL/L — SIGNIFICANT CHANGE UP (ref 98–110)
CO2 SERPL-SCNC: 21 MMOL/L — SIGNIFICANT CHANGE UP (ref 17–32)
CREAT SERPL-MCNC: 0.6 MG/DL — LOW (ref 0.7–1.5)
EGFR: 92 ML/MIN/1.73M2 — SIGNIFICANT CHANGE UP
EOSINOPHIL # BLD AUTO: 0.2 K/UL — SIGNIFICANT CHANGE UP (ref 0–0.7)
EOSINOPHIL NFR BLD AUTO: 2.1 % — SIGNIFICANT CHANGE UP (ref 0–8)
GLUCOSE SERPL-MCNC: 78 MG/DL — SIGNIFICANT CHANGE UP (ref 70–99)
HCT VFR BLD CALC: 40.4 % — SIGNIFICANT CHANGE UP (ref 37–47)
HGB BLD-MCNC: 13.6 G/DL — SIGNIFICANT CHANGE UP (ref 12–16)
IMM GRANULOCYTES NFR BLD AUTO: 0.3 % — SIGNIFICANT CHANGE UP (ref 0.1–0.3)
LYMPHOCYTES # BLD AUTO: 2.48 K/UL — SIGNIFICANT CHANGE UP (ref 1.2–3.4)
LYMPHOCYTES # BLD AUTO: 26.1 % — SIGNIFICANT CHANGE UP (ref 20.5–51.1)
MAGNESIUM SERPL-MCNC: 2.1 MG/DL — SIGNIFICANT CHANGE UP (ref 1.8–2.4)
MCHC RBC-ENTMCNC: 30.9 PG — SIGNIFICANT CHANGE UP (ref 27–31)
MCHC RBC-ENTMCNC: 33.7 G/DL — SIGNIFICANT CHANGE UP (ref 32–37)
MCV RBC AUTO: 91.8 FL — SIGNIFICANT CHANGE UP (ref 81–99)
MONOCYTES # BLD AUTO: 0.79 K/UL — HIGH (ref 0.1–0.6)
MONOCYTES NFR BLD AUTO: 8.3 % — SIGNIFICANT CHANGE UP (ref 1.7–9.3)
NEUTROPHILS # BLD AUTO: 5.95 K/UL — SIGNIFICANT CHANGE UP (ref 1.4–6.5)
NEUTROPHILS NFR BLD AUTO: 62.6 % — SIGNIFICANT CHANGE UP (ref 42.2–75.2)
NRBC # BLD: 0 /100 WBCS — SIGNIFICANT CHANGE UP (ref 0–0)
PLATELET # BLD AUTO: 210 K/UL — SIGNIFICANT CHANGE UP (ref 130–400)
PMV BLD: 12.7 FL — HIGH (ref 7.4–10.4)
POTASSIUM SERPL-MCNC: 4.2 MMOL/L — SIGNIFICANT CHANGE UP (ref 3.5–5)
POTASSIUM SERPL-SCNC: 4.2 MMOL/L — SIGNIFICANT CHANGE UP (ref 3.5–5)
PROT SERPL-MCNC: 5.8 G/DL — LOW (ref 6–8)
RBC # BLD: 4.4 M/UL — SIGNIFICANT CHANGE UP (ref 4.2–5.4)
RBC # FLD: 13.4 % — SIGNIFICANT CHANGE UP (ref 11.5–14.5)
SODIUM SERPL-SCNC: 144 MMOL/L — SIGNIFICANT CHANGE UP (ref 135–146)
WBC # BLD: 9.51 K/UL — SIGNIFICANT CHANGE UP (ref 4.8–10.8)
WBC # FLD AUTO: 9.51 K/UL — SIGNIFICANT CHANGE UP (ref 4.8–10.8)

## 2024-02-29 PROCEDURE — 99232 SBSQ HOSP IP/OBS MODERATE 35: CPT

## 2024-02-29 RX ORDER — SODIUM CHLORIDE 9 MG/ML
1000 INJECTION, SOLUTION INTRAVENOUS
Refills: 0 | Status: DISCONTINUED | OUTPATIENT
Start: 2024-02-29 | End: 2024-03-01

## 2024-02-29 RX ADMIN — ENOXAPARIN SODIUM 40 MILLIGRAM(S): 100 INJECTION SUBCUTANEOUS at 12:23

## 2024-02-29 RX ADMIN — HALOPERIDOL DECANOATE 1 MILLIGRAM(S): 100 INJECTION INTRAMUSCULAR at 15:02

## 2024-02-29 RX ADMIN — ESCITALOPRAM OXALATE 5 MILLIGRAM(S): 10 TABLET, FILM COATED ORAL at 12:22

## 2024-02-29 RX ADMIN — SODIUM CHLORIDE 30 MILLILITER(S): 9 INJECTION, SOLUTION INTRAVENOUS at 16:35

## 2024-02-29 NOTE — HOSPICE CARE NOTE - CONVESATION DETAILS
Daughter Mallory open to a follow up on Monday March 4 to further discuss hospice services. At this time her goal is to have patient D/C with a continuation of [facility] services and to complete the IV antibiotics which are in place at this time. Update provided to NELLA Shields. 
Patient pending auth for transfer to [facility]  with hospice services. 
Hospice intake has spoken with daughter Radha Simpson [phone] to discuss hospice services as family is seeking Hospice care at [facility]. CM to be made aware  once Medical Director approves as well as [facility] accepts patient Hospice to admit at [facility].   Hospice to remain in communication with family and Medical team till DC. Hospice contact information has also been provided.

## 2024-02-29 NOTE — PROGRESS NOTE ADULT - ASSESSMENT
79yo female with history of advanced Alzheimer's dementia, is sent to the ER due to not taking oral nutrition for last several days. Daughter also noted increased contractures. No fevers or chills     Assessment    ·	Adult Failure to thrive with poor oral intake  ·	Marked dehydration with hypernatremia, hypercalcemia, increased anion gap, resolved  ·	Fever with leukocytosis (sepsis) likely secondary to UTI (resolved)  ·	Alzheimer's now with agitation, could have element of a metabolic encephalopathy related to above listed issues, improving       Plan  - Ucx ecoli S to cipro. S/p Rocephin, then cipro, ended 2/27. Bl cx NTD  - Seen by speech and swallow: abbey. 1:1 feeds  - Maintenance fluids   - Pain control for contraction. OOBTC   - hospice being set up    DVT ppx: lovenox   Dispo: Medically ready for dc/ to [facility] with hospice

## 2024-02-29 NOTE — CHART NOTE - NSCHARTNOTEFT_GEN_A_CORE
met with PT at bedside; no family present. Pt presented alert and pleasantly confused. Pt reported that she was comfortable; no pain noted. Pt is pending SNF placement at [facility] with hospice services. Will sign off as goals of care are clear: DNR/DNI with plans for hospice in SNF. Re-consult PRN [phone]

## 2024-02-29 NOTE — PROGRESS NOTE ADULT - SUBJECTIVE AND OBJECTIVE BOX
SUBJECTIVE:    Patient is a 78y old Female who presents with a chief complaint of failure to thrive (26 Feb 2024 10:46)      HPI:  77yo female with history of advanced Alzheimer's dementia, is sent to the ER due to not taking oral nutrition for last several days. Daughter also noted increased contractures. No fevers or chills  (21 Feb 2024 19:33)      Currently admitted to medicine with the primary diagnosis of Adult failure to thrive    no complaitns today     Besides the pertinent positives and negatives described above, the ROS was within normal limits.    PAST MEDICAL & SURGICAL HISTORY  Alzheimer's dementia      SOCIAL HISTORY:    ALLERGIES:  No Known Allergies    MEDICATIONS:  STANDING MEDICATIONS  dextrose 5% + sodium chloride 0.45%. 1000 milliLiter(s) IV Continuous <Continuous>  enoxaparin Injectable 40 milliGRAM(s) SubCutaneous every 24 hours  escitalopram 5 milliGRAM(s) Oral daily    PRN MEDICATIONS  acetaminophen  Suppository .. 325 milliGRAM(s) Rectal every 6 hours PRN  haloperidol    Injectable 1 milliGRAM(s) IV Push every 12 hours PRN    VITALS:   T(F): 96.6  HR: 86  BP: 122/76  RR: 18  SpO2: 96%    LABS:                        13.6   9.51  )-----------( 210      ( 29 Feb 2024 07:00 )             40.4     02-29    144  |  109  |  4<L>  ----------------------------<  78  4.2   |  21  |  0.6<L>    Ca    9.5      29 Feb 2024 07:00  Mg     2.1     02-29    TPro  5.8<L>  /  Alb  3.9  /  TBili  0.5  /  DBili  x   /  AST  20  /  ALT  15  /  AlkPhos  74  02-29      Urinalysis Basic - ( 29 Feb 2024 07:00 )    Color: x / Appearance: x / SG: x / pH: x  Gluc: 78 mg/dL / Ketone: x  / Bili: x / Urobili: x   Blood: x / Protein: x / Nitrite: x   Leuk Esterase: x / RBC: x / WBC x   Sq Epi: x / Non Sq Epi: x / Bacteria: x                Adult failure to thrive      RADIOLOGY:    PHYSICAL EXAM:  General: WN/WD NAD  Neurology: A&Ox2  Head:  Normocephalic, atraumatic  ENT:  Mucosa moist, no ulcerations  Neck:  Supple, no sinuses or palpable masses  Lymphatic:  No palpable cervical, supraclavicular, axillary or inguinal adenopathy  Respiratory: CTA B/L  CV: RRR, S1S2, no murmur  Abdominal: Soft, NT, ND no palpable mass  MSK: No edema, + peripheral pulses  Incisions: intact, no erythema or drainage    Intravenous access: yes  NG tube: no  Guillory Catheter: no

## 2024-03-01 LAB
ANION GAP SERPL CALC-SCNC: 13 MMOL/L — SIGNIFICANT CHANGE UP (ref 7–14)
BUN SERPL-MCNC: 4 MG/DL — LOW (ref 10–20)
CALCIUM SERPL-MCNC: 9.2 MG/DL — SIGNIFICANT CHANGE UP (ref 8.4–10.5)
CHLORIDE SERPL-SCNC: 106 MMOL/L — SIGNIFICANT CHANGE UP (ref 98–110)
CO2 SERPL-SCNC: 23 MMOL/L — SIGNIFICANT CHANGE UP (ref 17–32)
CREAT SERPL-MCNC: 0.7 MG/DL — SIGNIFICANT CHANGE UP (ref 0.7–1.5)
EGFR: 88 ML/MIN/1.73M2 — SIGNIFICANT CHANGE UP
GLUCOSE SERPL-MCNC: 89 MG/DL — SIGNIFICANT CHANGE UP (ref 70–99)
POTASSIUM SERPL-MCNC: 3.7 MMOL/L — SIGNIFICANT CHANGE UP (ref 3.5–5)
POTASSIUM SERPL-SCNC: 3.7 MMOL/L — SIGNIFICANT CHANGE UP (ref 3.5–5)
SODIUM SERPL-SCNC: 142 MMOL/L — SIGNIFICANT CHANGE UP (ref 135–146)

## 2024-03-01 PROCEDURE — 99232 SBSQ HOSP IP/OBS MODERATE 35: CPT

## 2024-03-01 RX ADMIN — ENOXAPARIN SODIUM 40 MILLIGRAM(S): 100 INJECTION SUBCUTANEOUS at 12:03

## 2024-03-01 RX ADMIN — Medication 10 MILLIGRAM(S): at 12:02

## 2024-03-01 RX ADMIN — ESCITALOPRAM OXALATE 5 MILLIGRAM(S): 10 TABLET, FILM COATED ORAL at 12:03

## 2024-03-01 NOTE — PROGRESS NOTE ADULT - ASSESSMENT
77yo female with history of advanced Alzheimer's dementia, is sent to the ER due to not taking oral nutrition for last several days. Daughter also noted increased contractures. No fevers or chills     Assessment    ·	Adult Failure to thrive with poor oral intake (awaiting hospice)  ·	Marked dehydration with hypernatremia, hypercalcemia, increased anion gap, resolved  ·	Fever with leukocytosis (sepsis) likely secondary to UTI (resolved)  ·	Alzheimer's with intermittent agitation      Plan  - Ucx ecoli S to cipro. S/p Rocephin, then cipro, ended 2/27. Bl cx NTD  - Seen by speech and swallow: abbey. 1:1 feeds  - Pain control for contraction. OOBTC   - hospice being set up    DVT ppx: lovenox   Dispo: Medically ready for dc/ to [facility] with hospice

## 2024-03-01 NOTE — PROGRESS NOTE ADULT - SUBJECTIVE AND OBJECTIVE BOX
SUBJECTIVE:    Patient is a 78y old Female who presents with a chief complaint of failure to thrive (26 Feb 2024 10:46)      HPI:  77yo female with history of advanced Alzheimer's dementia, is sent to the ER due to not taking oral nutrition for last several days. Daughter also noted increased contractures. No fevers or chills  (21 Feb 2024 19:33)      Currently admitted to medicine with the primary diagnosis of Adult failure to thrive    comfortable in bed, no pain    Besides the pertinent positives and negatives described above, the ROS was within normal limits.    PAST MEDICAL & SURGICAL HISTORY  Alzheimer's dementia      SOCIAL HISTORY:    ALLERGIES:  No Known Allergies    MEDICATIONS:  STANDING MEDICATIONS  enoxaparin Injectable 40 milliGRAM(s) SubCutaneous every 24 hours  escitalopram 5 milliGRAM(s) Oral daily    PRN MEDICATIONS  acetaminophen  Suppository .. 325 milliGRAM(s) Rectal every 6 hours PRN  bisacodyl Suppository 10 milliGRAM(s) Rectal daily PRN  haloperidol    Injectable 1 milliGRAM(s) IV Push every 12 hours PRN    VITALS:   T(F): 97.8  HR: 104  BP: 129/78  RR: 18  SpO2: 95%    LABS:                        13.6   9.51  )-----------( 210      ( 29 Feb 2024 07:00 )             40.4     03-01    142  |  106  |  4<L>  ----------------------------<  89  3.7   |  23  |  0.7    Ca    9.2      01 Mar 2024 08:37  Mg     2.1     02-29    TPro  5.8<L>  /  Alb  3.9  /  TBili  0.5  /  DBili  x   /  AST  20  /  ALT  15  /  AlkPhos  74  02-29      Urinalysis Basic - ( 01 Mar 2024 08:37 )    Color: x / Appearance: x / SG: x / pH: x  Gluc: 89 mg/dL / Ketone: x  / Bili: x / Urobili: x   Blood: x / Protein: x / Nitrite: x   Leuk Esterase: x / RBC: x / WBC x   Sq Epi: x / Non Sq Epi: x / Bacteria: x                Adult failure to thrive      RADIOLOGY:    PHYSICAL EXAM:  General: WN/WD NAD  Neurology: A&Ox3, nonfocal, CUETO x 4  Head:  Normocephalic, atraumatic  ENT:  Mucosa moist, no ulcerations  Neck:  Supple, no sinuses or palpable masses  Lymphatic:  No palpable cervical, supraclavicular, axillary or inguinal adenopathy  Respiratory: CTA B/L  CV: RRR, S1S2, no murmur  Abdominal: Soft, NT, ND no palpable mass  MSK: No edema  Incisions: intact, no erythema or drainage    Intravenous access: yes  NG tube: no  Guillory Catheter: no

## 2024-03-02 LAB
ANION GAP SERPL CALC-SCNC: 11 MMOL/L — SIGNIFICANT CHANGE UP (ref 7–14)
BUN SERPL-MCNC: 10 MG/DL — SIGNIFICANT CHANGE UP (ref 10–20)
CALCIUM SERPL-MCNC: 9.3 MG/DL — SIGNIFICANT CHANGE UP (ref 8.4–10.5)
CHLORIDE SERPL-SCNC: 107 MMOL/L — SIGNIFICANT CHANGE UP (ref 98–110)
CO2 SERPL-SCNC: 24 MMOL/L — SIGNIFICANT CHANGE UP (ref 17–32)
CREAT SERPL-MCNC: 0.6 MG/DL — LOW (ref 0.7–1.5)
EGFR: 92 ML/MIN/1.73M2 — SIGNIFICANT CHANGE UP
GLUCOSE SERPL-MCNC: 98 MG/DL — SIGNIFICANT CHANGE UP (ref 70–99)
HCT VFR BLD CALC: 33.7 % — LOW (ref 37–47)
HGB BLD-MCNC: 11.5 G/DL — LOW (ref 12–16)
MCHC RBC-ENTMCNC: 30.3 PG — SIGNIFICANT CHANGE UP (ref 27–31)
MCHC RBC-ENTMCNC: 34.1 G/DL — SIGNIFICANT CHANGE UP (ref 32–37)
MCV RBC AUTO: 88.7 FL — SIGNIFICANT CHANGE UP (ref 81–99)
NRBC # BLD: 0 /100 WBCS — SIGNIFICANT CHANGE UP (ref 0–0)
PLATELET # BLD AUTO: 345 K/UL — SIGNIFICANT CHANGE UP (ref 130–400)
PMV BLD: 10.2 FL — SIGNIFICANT CHANGE UP (ref 7.4–10.4)
POTASSIUM SERPL-MCNC: 3.7 MMOL/L — SIGNIFICANT CHANGE UP (ref 3.5–5)
POTASSIUM SERPL-SCNC: 3.7 MMOL/L — SIGNIFICANT CHANGE UP (ref 3.5–5)
RBC # BLD: 3.8 M/UL — LOW (ref 4.2–5.4)
RBC # FLD: 13.9 % — SIGNIFICANT CHANGE UP (ref 11.5–14.5)
SODIUM SERPL-SCNC: 142 MMOL/L — SIGNIFICANT CHANGE UP (ref 135–146)
WBC # BLD: 7.87 K/UL — SIGNIFICANT CHANGE UP (ref 4.8–10.8)
WBC # FLD AUTO: 7.87 K/UL — SIGNIFICANT CHANGE UP (ref 4.8–10.8)

## 2024-03-02 PROCEDURE — 99232 SBSQ HOSP IP/OBS MODERATE 35: CPT

## 2024-03-02 RX ORDER — HEPARIN SODIUM 5000 [USP'U]/ML
5000 INJECTION INTRAVENOUS; SUBCUTANEOUS EVERY 12 HOURS
Refills: 0 | Status: DISCONTINUED | OUTPATIENT
Start: 2024-03-03 | End: 2024-03-05

## 2024-03-02 RX ADMIN — ENOXAPARIN SODIUM 40 MILLIGRAM(S): 100 INJECTION SUBCUTANEOUS at 12:49

## 2024-03-02 RX ADMIN — ESCITALOPRAM OXALATE 5 MILLIGRAM(S): 10 TABLET, FILM COATED ORAL at 12:49

## 2024-03-02 NOTE — CHART NOTE - NSCHARTNOTEFT_GEN_A_CORE
Registered Dietitian Follow-Up     Patient Profile Reviewed                           Yes [X]   No []     Nutrition History Previously Obtained        Yes [X]  No []       Pertinent Subjective Information:     Pertinent Medical Interventions:   ·Adult Failure to thrive with poor oral intake (awaiting hospice)  ·Marked dehydration with hypernatremia, hypercalcemia, increased anion gap, resolved  ·Fever with leukocytosis (sepsis) likely secondary to UTI (resolved)  ·Alzheimer's with intermittent agitation  Plan  - Ucx ecoli S to cipro. S/p Rocephin, then cipro, ended 2/27. Bl cx NTD  - Seen by speech and swallow: puree. 1:1 feeds  - Pain control for contraction. OOBTC   - hospice being set up     Diet order:   Diet, Pureed:   Moderately Thick Liquids (MODTHICKLIQS) (02-23-24 @ 16:07) [Active]    Anthropometrics:  Ht: 152.4 cm  Wt: 38.3 kg (2-21-24)  IBW: 45 kg  UBW:     Daily   % Weight Change    Pertinent MEDICATIONS  (STANDING):  enoxaparin Injectable 40 milliGRAM(s) SubCutaneous every 24 hours    Pertinent MEDICATIONS  (PRN):  bisacodyl Suppository 10 milliGRAM(s) Rectal daily PRN Constipation    Pertinent Labs: 2/29: Hct 13.6 N, Hgb 40.4 N, BUN 4 L, protein 5.8 L    Physical Findings:  - Appearance:  - GI function:   - Tubes: N/A  - Oral/Mouth cavity: SLP recs 2/23 for puree with mildly thick liquids  - Skin: no PI  - Edema: no edema     Nutrition Requirements: considering underweight  Weight Used: dosing wt 38.3 kg     Estimated Energy Needs    Continue [X]  Adjust []  Adjusted Energy Recommendations:  1085-9529 kcal/day (30-35 kcal/kg)        Estimated Protein Needs    Continue [X]  Adjust []  Adjusted Protein Recommendations:  46-54 gm/day (1.2-1.4 g/kg)        Estimated Fluid Needs        Continue [X]  Adjust []  Adjusted Fluid Recommendations: 6067-4411 mL/day (35-40 mL/kg)     Nutrient Intake:    [] Previous Nutrition Diagnosis: none            [] Ongoing          [] Resolved    [] No active nutrition diagnosis identified at this time     Nutrition Education:      Goal/Expected Outcome:      Indicator/Monitoring: energy intake, GI/IO, nutrition-related labs, body composition    Recommendation: Registered Dietitian Follow-Up     Patient Profile Reviewed                           Yes [X]   No []     Nutrition History Previously Obtained        Yes [X]  No []       Pertinent Subjective Information: Pt received laying in bed with no family at bedside, RN reports feeding her lunch and she only eats as much as she wants to. If she stops and comes back to it later, might eat a few more bites.     Pertinent Medical Interventions:   ·Adult Failure to thrive with poor oral intake (awaiting hospice)  ·Marked dehydration with hypernatremia, hypercalcemia, increased anion gap, resolved  ·Fever with leukocytosis (sepsis) likely secondary to UTI (resolved)  ·Alzheimer's with intermittent agitation  Plan  - Ucx ecoli S to cipro. S/p Rocephin, then cipro, ended 2/27. Bl cx NTD  - Seen by speech and swallow: puree. 1:1 feeds  - Pain control for contraction. OOBTC   - hospice being set up     Diet order:   Diet, Pureed:   Moderately Thick Liquids (MODTHICKLIQS) (02-23-24 @ 16:07) [Active]    Anthropometrics:  Ht: 152.4 cm  Wt: 38.3 kg (2-21-24)  IBW: 45 kg  UBW: unknown    Daily   % Weight Change    Pertinent MEDICATIONS  (STANDING):  enoxaparin Injectable 40 milliGRAM(s) SubCutaneous every 24 hours    Pertinent MEDICATIONS  (PRN):  bisacodyl Suppository 10 milliGRAM(s) Rectal daily PRN Constipation    Pertinent Labs: 2/29: Hct 13.6 N, Hgb 40.4 N, BUN 4 L, protein 5.8 L    Physical Findings:  - Appearance: severe fat and muscle wasting noted  - GI function: last bowel movement noted in flowsheets 2/24, RN unsure when last bowel movement was and might give laxative today  - Tubes: N/A  - Oral/Mouth cavity: SLP recs 2/23 for puree with mildly thick liquids  - Skin: no PI  - Edema: no edema     Nutrition Requirements: considering underweight  Weight Used: dosing wt 38.3 kg     Estimated Energy Needs    Continue [X]  Adjust []  Adjusted Energy Recommendations:  3903-6528 kcal/day (30-35 kcal/kg)        Estimated Protein Needs    Continue [X]  Adjust []  Adjusted Protein Recommendations:  46-54 gm/day (1.2-1.4 g/kg)        Estimated Fluid Needs        Continue [X]  Adjust []  Adjusted Fluid Recommendations: 0453-2718 mL/day (35-40 mL/kg)     Nutrient Intake: Pt ate about 25% breakfast and lunch per RN    [] Previous Nutrition Diagnosis: none            [] Ongoing          [] Resolved    Nutrition dx: severe malnutrition in the context of chronic illness related to Alzheimer's, as evidenced by severe muscle and fat wasting.    [] No active nutrition diagnosis identified at this time     Nutrition Education: Pt not appropriate for education at this time     Goal/Expected Outcome: Pt to consume >50% meals within 3 days     Indicator/Monitoring: energy intake, GI/IO, nutrition-related labs, body composition  RD to f/u 3 days (high risk)    Recommendation:  Can consider appetite stimulant if within GOC.    Emerald Kaye RD [phone] or via teams

## 2024-03-02 NOTE — PROGRESS NOTE ADULT - ASSESSMENT
77yo female with history of advanced Alzheimer's dementia, is sent to the ER due to not taking oral nutrition for last several days. Daughter also noted increased contractures. No fevers or chills     Assessment    ·	Adult Failure to thrive with poor oral intake (awaiting hospice)  ·	Marked dehydration with hypernatremia, hypercalcemia, increased anion gap, resolved  ·	Fever with leukocytosis (sepsis) likely secondary to UTI (resolved)  ·	Alzheimer's with intermittent agitation      Plan  - Ucx ecoli S to cipro. S/p Rocephin, then cipro, ended 2/27. Bl cx NTD  - Seen by speech and swallow: abbey. 1:1 feeds  - Pain control for contraction. OOBTC   - hospice being set up    DVT ppx: heparin sc  Dispo: Medically ready for dc/ to [facility] with hospice

## 2024-03-02 NOTE — PROGRESS NOTE ADULT - SUBJECTIVE AND OBJECTIVE BOX
SUBJECTIVE:    Patient is a 78y old Female who presents with a chief complaint of failure to thrive (26 Feb 2024 10:46)      HPI:  79yo female with history of advanced Alzheimer's dementia, is sent to the ER due to not taking oral nutrition for last several days. Daughter also noted increased contractures. No fevers or chills  (21 Feb 2024 19:33)      Currently admitted to medicine with the primary diagnosis of Adult failure to thrive    no complaints today    Besides the pertinent positives and negatives described above, the ROS was within normal limits.    PAST MEDICAL & SURGICAL HISTORY  Alzheimer's dementia      SOCIAL HISTORY:    ALLERGIES:  No Known Allergies    MEDICATIONS:  STANDING MEDICATIONS  escitalopram 5 milliGRAM(s) Oral daily    PRN MEDICATIONS  acetaminophen  Suppository .. 325 milliGRAM(s) Rectal every 6 hours PRN  bisacodyl Suppository 10 milliGRAM(s) Rectal daily PRN    VITALS:   T(F): 96.6  HR: 107  BP: 98/63  RR: 18  SpO2: 96%    LABS:    03-01    142  |  106  |  4<L>  ----------------------------<  89  3.7   |  23  |  0.7    Ca    9.2      01 Mar 2024 08:37        Urinalysis Basic - ( 01 Mar 2024 08:37 )    Color: x / Appearance: x / SG: x / pH: x  Gluc: 89 mg/dL / Ketone: x  / Bili: x / Urobili: x   Blood: x / Protein: x / Nitrite: x   Leuk Esterase: x / RBC: x / WBC x   Sq Epi: x / Non Sq Epi: x / Bacteria: x                Adult failure to thrive      RADIOLOGY:    PHYSICAL EXAM:  General: WN/WD NAD  Neurology: A&Ox3, nonfocal, CUETO x 4  Head:  Normocephalic, atraumatic  ENT:  Mucosa moist, no ulcerations  Neck:  Supple, no sinuses or palpable masses  Lymphatic:  No palpable cervical, supraclavicular, axillary or inguinal adenopathy  Respiratory: CTA B/L  CV: RRR, S1S2, no murmur  Abdominal: Soft, NT, ND no palpable mass  MSK: No edema, + peripheral pulses  Incisions: intact, no erythema or drainage    Intravenous access: yes  NG tube: no  Guillory Catheter: no

## 2024-03-03 PROCEDURE — 99232 SBSQ HOSP IP/OBS MODERATE 35: CPT

## 2024-03-03 RX ORDER — SODIUM CHLORIDE 9 MG/ML
1000 INJECTION, SOLUTION INTRAVENOUS
Refills: 0 | Status: DISCONTINUED | OUTPATIENT
Start: 2024-03-03 | End: 2024-03-05

## 2024-03-03 RX ADMIN — HEPARIN SODIUM 5000 UNIT(S): 5000 INJECTION INTRAVENOUS; SUBCUTANEOUS at 18:17

## 2024-03-03 RX ADMIN — ESCITALOPRAM OXALATE 5 MILLIGRAM(S): 10 TABLET, FILM COATED ORAL at 12:45

## 2024-03-03 RX ADMIN — HEPARIN SODIUM 5000 UNIT(S): 5000 INJECTION INTRAVENOUS; SUBCUTANEOUS at 05:04

## 2024-03-03 RX ADMIN — Medication 10 MILLIGRAM(S): at 15:51

## 2024-03-03 NOTE — PROGRESS NOTE ADULT - ASSESSMENT
77yo female with history of advanced Alzheimer's dementia, is sent to the ER due to not taking oral nutrition for last several days. Daughter also noted increased contractures. No fevers or chills     Assessment    ·	Adult Failure to thrive with poor oral intake (awaiting hospice)  ·	Marked dehydration with hypernatremia, hypercalcemia, increased anion gap, resolved  ·	Fever with leukocytosis (sepsis) likely secondary to UTI (resolved)  ·	Alzheimer's with intermittent agitation      Plan  - Ucx ecoli S to cipro. S/p Rocephin, then cipro, ended 2/27. Bl cx NTD  - Seen by speech and swallow: abbey. 1:1 feeds  - Pain control for contraction. OOBTC   - hospice being set up  - bisacodyl for constipation    DVT ppx: heparin sc  Dispo: Medically ready for dc/ to [facility] with hospice

## 2024-03-03 NOTE — PROGRESS NOTE ADULT - SUBJECTIVE AND OBJECTIVE BOX
SUBJECTIVE:    Patient is a 78y old Female who presents with a chief complaint of failure to thrive (26 Feb 2024 10:46)      HPI:  77yo female with history of advanced Alzheimer's dementia, is sent to the ER due to not taking oral nutrition for last several days. Daughter also noted increased contractures. No fevers or chills  (21 Feb 2024 19:33)      Currently admitted to medicine with the primary diagnosis of Adult failure to thrive    having some mild abd tenderness    Besides the pertinent positives and negatives described above, the ROS was within normal limits.    PAST MEDICAL & SURGICAL HISTORY  Alzheimer's dementia      SOCIAL HISTORY:    ALLERGIES:  No Known Allergies    MEDICATIONS:  STANDING MEDICATIONS  dextrose 5% + sodium chloride 0.45%. 1000 milliLiter(s) IV Continuous <Continuous>  escitalopram 5 milliGRAM(s) Oral daily  heparin   Injectable 5000 Unit(s) SubCutaneous every 12 hours    PRN MEDICATIONS  acetaminophen  Suppository .. 325 milliGRAM(s) Rectal every 6 hours PRN  bisacodyl Suppository 10 milliGRAM(s) Rectal daily PRN    VITALS:   T(F): 96.3  HR: 95  BP: 107/72  RR: 18  SpO2: 96%    LABS:                        11.5   7.87  )-----------( 345      ( 02 Mar 2024 15:46 )             33.7     03-02    142  |  107  |  10  ----------------------------<  98  3.7   |  24  |  0.6<L>    Ca    9.3      02 Mar 2024 15:46        Urinalysis Basic - ( 02 Mar 2024 15:46 )    Color: x / Appearance: x / SG: x / pH: x  Gluc: 98 mg/dL / Ketone: x  / Bili: x / Urobili: x   Blood: x / Protein: x / Nitrite: x   Leuk Esterase: x / RBC: x / WBC x   Sq Epi: x / Non Sq Epi: x / Bacteria: x                Adult failure to thrive      RADIOLOGY:    PHYSICAL EXAM:  General: WN/WD NAD  Neurology: A&Ox3, nonfocal, CUETO x 4  Head:  Normocephalic, atraumatic  ENT:  Mucosa moist, no ulcerations  Neck:  Supple, no sinuses or palpable masses  Lymphatic:  No palpable cervical, supraclavicular, axillary or inguinal adenopathy  Respiratory: CTA B/L  CV: RRR, S1S2, no murmur  Abdominal: Soft, NT, ND no palpable mass  MSK: No edema, + peripheral pulses  Incisions: intact, no erythema or drainage    Intravenous access: yes  NG tube: no  Guillory Catheter: no

## 2024-03-04 PROCEDURE — 99232 SBSQ HOSP IP/OBS MODERATE 35: CPT

## 2024-03-04 RX ORDER — CHLORHEXIDINE GLUCONATE 213 G/1000ML
1 SOLUTION TOPICAL
Refills: 0 | Status: DISCONTINUED | OUTPATIENT
Start: 2024-03-04 | End: 2024-03-05

## 2024-03-04 RX ORDER — MORPHINE SULFATE 50 MG/1
2 CAPSULE, EXTENDED RELEASE ORAL ONCE
Refills: 0 | Status: DISCONTINUED | OUTPATIENT
Start: 2024-03-04 | End: 2024-03-04

## 2024-03-04 RX ADMIN — ESCITALOPRAM OXALATE 5 MILLIGRAM(S): 10 TABLET, FILM COATED ORAL at 11:54

## 2024-03-04 RX ADMIN — HEPARIN SODIUM 5000 UNIT(S): 5000 INJECTION INTRAVENOUS; SUBCUTANEOUS at 17:01

## 2024-03-04 RX ADMIN — MORPHINE SULFATE 2 MILLIGRAM(S): 50 CAPSULE, EXTENDED RELEASE ORAL at 08:56

## 2024-03-04 RX ADMIN — MORPHINE SULFATE 2 MILLIGRAM(S): 50 CAPSULE, EXTENDED RELEASE ORAL at 09:21

## 2024-03-04 RX ADMIN — HEPARIN SODIUM 5000 UNIT(S): 5000 INJECTION INTRAVENOUS; SUBCUTANEOUS at 06:23

## 2024-03-04 RX ADMIN — SODIUM CHLORIDE 40 MILLILITER(S): 9 INJECTION, SOLUTION INTRAVENOUS at 08:56

## 2024-03-04 NOTE — PROGRESS NOTE ADULT - NUTRITIONAL ASSESSMENT
This patient has been assessed with a concern for Malnutrition and has been determined to have a diagnosis/diagnoses of Severe protein-calorie malnutrition and Underweight (BMI < 19).    This patient is being managed with:   Diet Pureed-  Moderately Thick Liquids (MODTHICKLIQS)  Entered: Feb 23 2024  4:07PM  

## 2024-03-04 NOTE — PROGRESS NOTE ADULT - SUBJECTIVE AND OBJECTIVE BOX
SUBJECTIVE:    Patient is a 78y old Female who presents with a chief complaint of failure to thrive (26 Feb 2024 10:46)      HPI:  77yo female with history of advanced Alzheimer's dementia, is sent to the ER due to not taking oral nutrition for last several days. Daughter also noted increased contractures. No fevers or chills  (21 Feb 2024 19:33)      Currently admitted to medicine with the primary diagnosis of Adult failure to thrive     not endorsing any pain    Besides the pertinent positives and negatives described above, the ROS was within normal limits.    PAST MEDICAL & SURGICAL HISTORY  Alzheimer's dementia      SOCIAL HISTORY:    ALLERGIES:  No Known Allergies    MEDICATIONS:  STANDING MEDICATIONS  chlorhexidine 2% Cloths 1 Application(s) Topical <User Schedule>  dextrose 5% + sodium chloride 0.45%. 1000 milliLiter(s) IV Continuous <Continuous>  escitalopram 5 milliGRAM(s) Oral daily  heparin   Injectable 5000 Unit(s) SubCutaneous every 12 hours    PRN MEDICATIONS  acetaminophen  Suppository .. 325 milliGRAM(s) Rectal every 6 hours PRN  bisacodyl Suppository 10 milliGRAM(s) Rectal daily PRN    VITALS:   T(F): 96.9  HR: 78  BP: 110/67  RR: 18  SpO2: --    LABS:                        Adult failure to thrive      RADIOLOGY:    PHYSICAL EXAM:  General: WN/WD NAD  Neurology: A&Ox3, nonfocal, CUETO x 4  Head:  Normocephalic, atraumatic  ENT:  Mucosa moist, no ulcerations  Neck:  Supple, no sinuses or palpable masses  Lymphatic:  No palpable cervical, supraclavicular, axillary or inguinal adenopathy  Respiratory: CTA B/L  CV: RRR, S1S2, no murmur  Abdominal: Soft, NT, ND no palpable mass  MSK: No edema, + peripheral pulses  Incisions: intact, no erythema or drainage    Intravenous access: yes  NG tube: no  Guillory Catheter: no

## 2024-03-04 NOTE — PROGRESS NOTE ADULT - ASSESSMENT
79yo female with history of advanced Alzheimer's dementia, is sent to the ER due to not taking oral nutrition for last several days. Daughter also noted increased contractures. No fevers or chills     Assessment    ·	Adult Failure to thrive with poor oral intake (awaiting hospice)  ·	Marked dehydration with hypernatremia, hypercalcemia, increased anion gap, resolved  ·	Fever with leukocytosis (sepsis) likely secondary to UTI (resolved)  ·	Alzheimer's with intermittent agitation      Plan  - Ucx ecoli S to cipro. S/p Rocephin, then cipro, ended 2/27. Bl cx NTD  - Seen by speech and swallow: abbey. 1:1 feeds  - Pain control for contraction. OOBTC   - hospice being set up  - bisacodyl for constipation    DVT ppx: heparin sc  Dispo: Medically ready for dc/ to [facility] with hospice

## 2024-03-05 VITALS
RESPIRATION RATE: 18 BRPM | HEART RATE: 87 BPM | DIASTOLIC BLOOD PRESSURE: 74 MMHG | OXYGEN SATURATION: 93 % | SYSTOLIC BLOOD PRESSURE: 110 MMHG | TEMPERATURE: 97 F

## 2024-03-05 PROCEDURE — 99239 HOSP IP/OBS DSCHRG MGMT >30: CPT

## 2024-03-05 RX ORDER — MORPHINE SULFATE 50 MG/1
1 CAPSULE, EXTENDED RELEASE ORAL ONCE
Refills: 0 | Status: DISCONTINUED | OUTPATIENT
Start: 2024-03-05 | End: 2024-03-05

## 2024-03-05 RX ORDER — MORPHINE SULFATE 50 MG/1
1 CAPSULE, EXTENDED RELEASE ORAL EVERY 4 HOURS
Refills: 0 | Status: DISCONTINUED | OUTPATIENT
Start: 2024-03-05 | End: 2024-03-05

## 2024-03-05 RX ADMIN — MORPHINE SULFATE 1 MILLIGRAM(S): 50 CAPSULE, EXTENDED RELEASE ORAL at 06:06

## 2024-03-05 RX ADMIN — HEPARIN SODIUM 5000 UNIT(S): 5000 INJECTION INTRAVENOUS; SUBCUTANEOUS at 05:58

## 2024-03-05 RX ADMIN — MORPHINE SULFATE 1 MILLIGRAM(S): 50 CAPSULE, EXTENDED RELEASE ORAL at 13:04

## 2024-03-05 RX ADMIN — ESCITALOPRAM OXALATE 5 MILLIGRAM(S): 10 TABLET, FILM COATED ORAL at 13:04

## 2024-03-05 NOTE — DISCHARGE NOTE NURSING/CASE MANAGEMENT/SOCIAL WORK - PATIENT PORTAL LINK FT
You can access the FollowMyHealth Patient Portal offered by [facility] by registering at the following website: [email or web address]. By joining [employer]’s FollowMyHealth portal, you will also be able to view your health information using other applications (apps) compatible with our system.

## 2024-03-05 NOTE — CHART NOTE - NSCHARTNOTESELECT_GEN_ALL_CORE
Event Note
Nutrition Services
Palliative Care- Social Work/Event Note
Event Note
Palliative Care- Social Work/Event Note
pt c/o severe pain

## 2024-03-12 DIAGNOSIS — Z66 DO NOT RESUSCITATE: ICD-10-CM

## 2024-03-12 DIAGNOSIS — Z51.5 ENCOUNTER FOR PALLIATIVE CARE: ICD-10-CM

## 2024-03-12 DIAGNOSIS — Z71.3 DIETARY COUNSELING AND SURVEILLANCE: ICD-10-CM

## 2024-03-12 DIAGNOSIS — E86.0 DEHYDRATION: ICD-10-CM

## 2024-03-12 DIAGNOSIS — B96.20 UNSPECIFIED ESCHERICHIA COLI [E. COLI] AS THE CAUSE OF DISEASES CLASSIFIED ELSEWHERE: ICD-10-CM

## 2024-03-12 DIAGNOSIS — F02.811 DEMENTIA IN OTHER DISEASES CLASSIFIED ELSEWHERE, UNSPECIFIED SEVERITY, WITH AGITATION: ICD-10-CM

## 2024-03-12 DIAGNOSIS — A41.9 SEPSIS, UNSPECIFIED ORGANISM: ICD-10-CM

## 2024-03-12 DIAGNOSIS — R64 CACHEXIA: ICD-10-CM

## 2024-03-12 DIAGNOSIS — N39.0 URINARY TRACT INFECTION, SITE NOT SPECIFIED: ICD-10-CM

## 2024-03-12 DIAGNOSIS — G93.41 METABOLIC ENCEPHALOPATHY: ICD-10-CM

## 2024-03-12 DIAGNOSIS — E43 UNSPECIFIED SEVERE PROTEIN-CALORIE MALNUTRITION: ICD-10-CM

## 2024-03-12 DIAGNOSIS — R62.7 ADULT FAILURE TO THRIVE: ICD-10-CM

## 2024-03-12 DIAGNOSIS — G30.9 ALZHEIMER'S DISEASE, UNSPECIFIED: ICD-10-CM

## 2024-03-12 DIAGNOSIS — E87.0 HYPEROSMOLALITY AND HYPERNATREMIA: ICD-10-CM

## 2024-03-12 DIAGNOSIS — E83.52 HYPERCALCEMIA: ICD-10-CM

## 2025-07-01 NOTE — CONSULT NOTE ADULT - SUBJECTIVE AND OBJECTIVE BOX
dry, intact and no bleeding. Neurology Consult    Patient is a 77y old  Female who presents with a chief complaint of ams    HPI:  77 year old woman with a PMH of HTN alzheimer's disease and HTN presents to the ED after waking up at 0700 and the aide noticed patient staring and did not know her daughters name. Patient had some coughing and difficulty swallowing as per aide. Family reports approaching baseline in ED.   Stroke code in triage.     PAST MEDICAL & SURGICAL HISTORY:      FAMILY HISTORY:      Social History: (-) x 3    Allergies    No Known Allergies    Intolerances        MEDICATIONS  (STANDING):    MEDICATIONS  (PRN):      Vital Signs Last 24 Hrs  T(C): 36.7 (29 Aug 2023 09:35), Max: 36.7 (29 Aug 2023 09:35)  T(F): 98 (29 Aug 2023 09:35), Max: 98 (29 Aug 2023 09:35)  HR: 71 (29 Aug 2023 09:35) (71 - 71)  BP: 187/106 (29 Aug 2023 09:35) (187/106 - 187/106)  BP(mean): --  RR: 18 (29 Aug 2023 09:35) (18 - 18)  SpO2: 96% (29 Aug 2023 09:35) (96% - 96%)    Parameters below as of 29 Aug 2023 09:35  Patient On (Oxygen Delivery Method): room air        Examination:      Cognitive/Language:  Awake alert and pleasant.   Patient with chronic cognitive impairment  Oriented to name and can name high frequency objects but not low frequency  Patient confabulating on exam.  Eyes: minimally cooperative with exam but no gaze and reacts to visual threat on L eye. Right eye prosthesis   Face:  Facial sensation normal V1 - 3, no facial asymmetry.    Formal Muscle Strength Testing: no drift of bilateral UE LE  Mildly increased tone of right UE  Sensory examination:   Intact to light touch in all extremities.  Cerebellum:  limited coooperation  Gait deferred    NIHSS 4    Labs:                     Neuroimaging:  c< from: CT Brain Stroke Protocol (08.29.23 @ 09:56) >  IMPRESSION:    1.  Cerebral and cerebellar atrophy.    2.  Patchy foci of diminished attenuation in the periventricular white   matter and anterior limbs of the internal capsules likely representing   ischemic change, age indeterminant.    3.  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      < end of copied text >  < from: CT Angio Neck Stroke Protocol w/ IV Cont (08.29.23 @ 10:15) >  IMPRESSION:    CT PERFUSION:    No evidence of ischemic penumbra or core infarct.    CTA HEAD/NECK:    No significant vascular stenosis or large vessel occlusion.    OTHER:    Left thyroid nodule, correlation with thyroid ultrasound is suggested.        < end of copied text >
